# Patient Record
Sex: MALE | Race: WHITE | NOT HISPANIC OR LATINO | Employment: FULL TIME | URBAN - METROPOLITAN AREA
[De-identification: names, ages, dates, MRNs, and addresses within clinical notes are randomized per-mention and may not be internally consistent; named-entity substitution may affect disease eponyms.]

---

## 2017-01-05 ENCOUNTER — OFFICE VISIT (OUTPATIENT)
Dept: ORTHOPEDICS | Facility: CLINIC | Age: 32
End: 2017-01-05
Payer: COMMERCIAL

## 2017-01-05 VITALS — HEART RATE: 79 BPM | DIASTOLIC BLOOD PRESSURE: 84 MMHG | SYSTOLIC BLOOD PRESSURE: 136 MMHG | OXYGEN SATURATION: 98 %

## 2017-01-05 DIAGNOSIS — M79.672 LEFT FOOT PAIN: ICD-10-CM

## 2017-01-05 DIAGNOSIS — S92.352G: Primary | ICD-10-CM

## 2017-01-05 PROCEDURE — 99213 OFFICE O/P EST LOW 20 MIN: CPT | Performed by: FAMILY MEDICINE

## 2017-01-05 ASSESSMENT — PAIN SCALES - GENERAL: PAINLEVEL: MILD PAIN (3)

## 2017-01-05 NOTE — PROGRESS NOTES
HISTORY OF PRESENT ILLNESS  Mr. Dominguez is a pleasant 31 year old year old male who presents to clinic today for follow up of his Wallace fracture.  Joey explains that he still has pain, although he's improving.  He's having trouble standing for more than 4 hours at a time.  Additional history: as documented      REVIEW OF SYSTEMS (1/5/2017)  10 point ROS of systems including Constitutional, Eyes, Respiratory, Cardiovascular, Gastroenterology, Genitourinary, Integumentary, Musculoskeletal, Psychiatric were all negative except for pertinent positives noted in my HPI.     PHYSICAL EXAM  Filed Vitals:    01/05/17 0925   BP: 136/84   Pulse: 79   SpO2: 98%     General  - normal appearance, in no obvious distress  CV  - normal pulses at posterior tib and dorsalis pedis  Pulm  - normal respiratory pattern, non-labored  Musculoskeletal - left foot  - stance: in boot, favors left leg  - inspection: no swelling or effusion,  normal bone and joint alignment, no obvious deformity  - palpation: mildly TTP at base of MT5  - ROM: normal active and passive ROM of great and lesser toes, no pain with MT translation  - strength: 5/5 in all planes  Neuro  - no sensory or motor deficit, grossly normal coordination, normal muscle tone  Skin  - no ecchymosis, erythema, warmth, or induration, no obvious rash  Psych  - interactive, appropriate, normal mood and affect        ASSESSMENT & PLAN  Mr. Dominguez is a 31 year old year old male who is in the office today following up for his Wallace fracture.    Joey hasn't been able to progress to a point in which he's pain free.  I'm ordering a CT to assess for potential fracture non-union of the metatarsal.    We will follow up after his CT.    It was a pleasure taking care of Joey.        Santy Shelton DO, CAQSM

## 2017-01-05 NOTE — NURSING NOTE
"Joey Dominguez's goals for this visit include: Follow up with left foot pain.   He requests these members of his care team be copied on today's visit information: yes    PCP: Librado Howe    Referring Provider:  No referring provider defined for this encounter.    Chief Complaint   Patient presents with     RECHECK     Ankle/Foot left     Follow up with left foot pain.        Initial /84 mmHg  Pulse 79  SpO2 98% Estimated body mass index is 41.71 kg/(m^2) as calculated from the following:    Height as of 11/14/16: 1.88 m (6' 2\").    Weight as of 11/14/16: 147.419 kg (325 lb).  BP completed using cuff size: regular-left forearm    "

## 2017-01-05 NOTE — PATIENT INSTRUCTIONS
Thanks for coming today.  Ortho/Sports Medicine Clinic  [address] [place] [address]    To schedule future appointments in Ortho Clinic, you may call [phone].    To schedule ordered imaging by your provider:   Call Central Imaging [phone]    To schedule an injection ordered by your provider:  Call Central Imaging Injection scheduling line: [phone]  G[employer]elle Semiconductor[employer] available online at:  [email or web address]    Please call if any further questions or concerns ([phone]).  Clinic hours 8 am to 5 pm.    Return to clinic (call) if symptoms worsen or fail to improve.

## 2017-01-05 NOTE — MR AVS SNAPSHOT
After Visit Summary   2017    Joey Dominguez    MRN: [id number]           Patient Information     Date Of Birth          1985        Visit Information        Provider Department      2017 9:00 AM Santy Shelton,  [facility]        Today's Diagnoses     Wallace fracture, left, with delayed healing, subsequent encounter    -  1     Left foot pain           Care Instructions    Thanks for coming today.  Ortho/Sports Medicine Clinic  [address] [place] [address]    To schedule future appointments in Ortho Clinic, you may call [phone].    To schedule ordered imaging by your provider:   Call Central Imaging [phone]    To schedule an injection ordered by your provider:  Call Central Imaging Injection scheduling line: [phone]  PayParrot available online at:  [email or web address]    Please call if any further questions or concerns ([phone]).  Clinic hours 8 am to 5 pm.    Return to clinic (call) if symptoms worsen or fail to improve.          Follow-ups after your visit        Future tests that were ordered for you today     Open Future Orders        Priority Expected Expires Ordered    CT Foot Left w/o Contrast Routine  2018            Who to contact     If you have questions or need follow up information about today's clinic visit or your schedule please contact [facility] directly at [phone].  Normal or non-critical lab and imaging results will be communicated to you by MyChart, letter or phone within 4 business days after the clinic has received the results. If you do not hear from us within 7 days, please contact the clinic through MyChart or phone. If you have a critical or abnormal lab result, we will notify you by phone as soon as possible.  Submit refill requests through PayParrot or call your pharmacy and they will forward the refill request to us. Please allow 3 business days for  your refill to be completed.          Additional Information About Your Visit        ResponseTap (formerly [employer])hart Information     Layer is an electronic gateway that provides easy, online access to your medical records. With Layer, you can request a clinic appointment, read your test results, renew a prescription or communicate with your care team.     To sign up for Layer visit the website at [email or web address]   You will be asked to enter the access code listed below, as well as some personal information. Please follow the directions to create your username and password.     Your access code is: [email or web address]  Expires: 2017  9:51 AM     Your access code will  in 90 days. If you need help or a new code, please contact your [facility] or call [phone] for assistance.        Care EveryWhere ID     This is your Care EveryWhere ID. This could be used by other organizations to access your [facility] medical records  [id number]        Your Vitals Were     Pulse Pulse Oximetry                79 98%           Blood Pressure from Last 3 Encounters:   17 136/84   16 148/100   16 158/92    Weight from Last 3 Encounters:   16 147.419 kg (325 lb)   05/10/16 138.801 kg (306 lb)   12/10/13 153.769 kg (339 lb)               Primary Care Provider Office Phone # Fax #    Librado Howe PA-C [phone]       [facility] [address] [facility] [address]        Thank you!     Thank you for choosing [facility]  for your care. Our goal is always to provide you with excellent care. Hearing back from our patients is one way we can continue to improve our services. Please take a few minutes to complete the written survey that you may receive in the mail after your visit with us. Thank you!             Your Updated Medication List - Protect others around you: Learn how to safely use, store and throw away your medicines at  www.disposemymeds.org.          This list is accurate as of: 1/5/17  9:45 AM.  Always use your most recent med list.                   Brand Name Dispense Instructions for use    traMADol 50 MG tablet    ULTRAM    30 tablet    Take 1 tablet (50 mg) by mouth every 6 hours as needed for pain

## 2017-01-06 ENCOUNTER — RADIANT APPOINTMENT (OUTPATIENT)
Dept: CT IMAGING | Facility: CLINIC | Age: 32
End: 2017-01-06
Attending: FAMILY MEDICINE
Payer: COMMERCIAL

## 2017-01-06 ENCOUNTER — DOCUMENTATION ONLY (OUTPATIENT)
Dept: ORTHOPEDICS | Facility: CLINIC | Age: 32
End: 2017-01-06

## 2017-01-06 DIAGNOSIS — S92.352G: ICD-10-CM

## 2017-01-06 DIAGNOSIS — M79.672 LEFT FOOT PAIN: ICD-10-CM

## 2017-01-06 PROCEDURE — 73700 CT LOWER EXTREMITY W/O DYE: CPT | Mod: LT | Performed by: RADIOLOGY

## 2017-01-06 NOTE — PROGRESS NOTES
Attending Physicians Statement/ Progress report form completed and faxed to the [facility] with the past two office visits, 2 past xray reports and CT order.

## 2017-01-09 ENCOUNTER — TELEPHONE (OUTPATIENT)
Dept: ORTHOPEDICS | Facility: CLINIC | Age: 32
End: 2017-01-09

## 2017-01-09 DIAGNOSIS — M79.672 LEFT FOOT PAIN: ICD-10-CM

## 2017-01-09 DIAGNOSIS — S92.352G: Primary | ICD-10-CM

## 2017-01-09 NOTE — TELEPHONE ENCOUNTER
[facility] Call Center    Phone Message    Name of Caller: Joey    Phone Number: Home number on file [phone] (home)    Best time to return call: Any    May a detailed message be left on voicemail: yes    Relation to patient: Self    Reason for Call: Other: Patient is calling stating that disability paperwork has not reached the [facility] claims.  They are threatening to not pay patient and he is calling requesting that the disability paperwork be faxed over ASAP to [phone] claim #[id number]  if possible. Please call back to discuss.     Action Taken: Message routed to:  Adult Clinics: Sports Medicine p [phone]

## 2017-01-09 NOTE — TELEPHONE ENCOUNTER
Paperwork was re-faxed today 1/9/17. Original fax on 1/6/17 did not go through.  Gave copy of paperwork to pt's wife. Per Dr. Shelton he will be referring patient to a surgical podiatrist. Patient will check insurance coverage for which surgeon would be in network. After patient checks on insurance will refer to appropriate surgical podiatrist for consult.

## 2017-01-09 NOTE — TELEPHONE ENCOUNTER
I called Joey to inform him of his CT results. 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 Given our failure of non-operative treatment I'm referring him to our surgical colleagues at the [facility] to have a discussion regarding possible surgical management.    TEMI

## 2017-01-10 ENCOUNTER — TELEPHONE (OUTPATIENT)
Dept: ORTHOPEDICS | Facility: CLINIC | Age: 32
End: 2017-01-10

## 2017-01-10 NOTE — TELEPHONE ENCOUNTER
[facility] Call Center    Phone Message    Name of Caller: Joye    Phone Number: Home number on file [phone] (home)    Best time to return call: ANY    May a detailed message be left on voicemail: yes    Relation to patient: Self    Reason for Call: Other: Patient needs images of CT and Xrays sent to Dr. Farley's office. He has an appointment tomorrow morning. Thank you. please call back once complete     Action Taken: Message routed to:  Adult Clinics: Podiatry p [phone]

## 2017-01-19 ENCOUNTER — OFFICE VISIT (OUTPATIENT)
Dept: ORTHOPEDICS | Facility: CLINIC | Age: 32
End: 2017-01-19
Payer: COMMERCIAL

## 2017-01-19 VITALS — DIASTOLIC BLOOD PRESSURE: 102 MMHG | SYSTOLIC BLOOD PRESSURE: 150 MMHG | OXYGEN SATURATION: 98 % | HEART RATE: 87 BPM

## 2017-01-19 DIAGNOSIS — S92.352G: Primary | ICD-10-CM

## 2017-01-19 PROCEDURE — 99213 OFFICE O/P EST LOW 20 MIN: CPT | Performed by: FAMILY MEDICINE

## 2017-01-19 ASSESSMENT — PAIN SCALES - GENERAL: PAINLEVEL: MILD PAIN (2)

## 2017-01-19 NOTE — NURSING NOTE
"Joey Dominguez's goals for this visit include: Follow up with left foot pain.   He requests these members of his care team be copied on today's visit information: yes    PCP: Librado Howe    Referring Provider:  No referring provider defined for this encounter.    Chief Complaint   Patient presents with     RECHECK     Ankle/Foot left     Follow up with Wallace fracture of the left foot. Patient states that the pain has turned into more of a dull ache but by the end of the day it feels as though someone has stepped on it.        Initial /102 mmHg  Pulse 87  SpO2 98% Estimated body mass index is 41.71 kg/(m^2) as calculated from the following:    Height as of 11/14/16: 1.88 m (6' 2\").    Weight as of 11/14/16: 147.419 kg (325 lb).  BP completed using cuff size: large    "

## 2017-01-19 NOTE — PATIENT INSTRUCTIONS
Thanks for coming today.  Ortho/Sports Medicine Clinic  [address] [place] [address]    To schedule future appointments in Ortho Clinic, you may call [phone].    To schedule ordered imaging by your provider:   Call Central Imaging [phone]    To schedule an injection ordered by your provider:  Call Central Imaging Injection scheduling line: [phone]  OpenCloudhart available online at:  [email or web address]    Please call if any further questions or concerns ([phone]).  Clinic hours 8 am to 5 pm.    Return to clinic (call) if symptoms worsen or fail to improve.

## 2017-01-19 NOTE — PROGRESS NOTES
HISTORY OF PRESENT ILLNESS  Mr. Dominguez is a pleasant 31 year old year old male who presents to clinic today for follow up of his Wallace fracture.  Joey was seen last week by Dr. Farley.  At that time the boot was removed and his activity was progressed.  Plan was made at that visit to wait until Wednesday (yesterday) and if pain wasn't improved perform surgery (tomorrow).  Fortunately, Joey is feeling a bit better today.  Pain still present, moderate, more of a dull ache.  Does get worse towards the end of the day.    Severity: moderate  Timing: occurs intermittently  Previous similar pain: yes  Additional history: as documented      REVIEW OF SYSTEMS (1/19/2017)  10 point ROS of systems including Constitutional, Eyes, Respiratory, Cardiovascular, Gastroenterology, Genitourinary, Integumentary, Musculoskeletal, Psychiatric were all negative except for pertinent positives noted in my HPI.     PHYSICAL EXAM  Filed Vitals:    01/19/17 0905   BP: 150/102   Pulse: 87   SpO2: 98%         ASSESSMENT & PLAN  Mr. Dominguez is a 31 year old year old male who is in the office today following up for his Wallace fracture.    I'm happy that he's improving.  I filled out paperwork returning him to work with the solitary restriction of no climbing traditional ladders for the next 2 weeks.    Joey can follow up as needed.    It was a pleasure taking care of Joey.    20 minutes was spent in the room, 15 of which was spent on counseling and coordination of care.        Santy Shelton, DO, CAQSM

## 2017-04-25 DIAGNOSIS — M79.672 LEFT FOOT PAIN: Primary | ICD-10-CM

## 2017-04-26 ENCOUNTER — RADIANT APPOINTMENT (OUTPATIENT)
Dept: GENERAL RADIOLOGY | Facility: CLINIC | Age: 32
End: 2017-04-26
Attending: FAMILY MEDICINE
Payer: COMMERCIAL

## 2017-04-26 DIAGNOSIS — M79.672 LEFT FOOT PAIN: ICD-10-CM

## 2017-04-26 PROCEDURE — 73630 X-RAY EXAM OF FOOT: CPT | Mod: LT | Performed by: RADIOLOGY

## 2017-07-13 DIAGNOSIS — M79.672 LEFT FOOT PAIN: Primary | ICD-10-CM

## 2017-08-30 ENCOUNTER — OFFICE VISIT (OUTPATIENT)
Dept: FAMILY MEDICINE | Facility: CLINIC | Age: 32
End: 2017-08-30
Payer: COMMERCIAL

## 2017-08-30 VITALS
TEMPERATURE: 97.6 F | DIASTOLIC BLOOD PRESSURE: 102 MMHG | WEIGHT: 315 LBS | BODY MASS INDEX: 40.96 KG/M2 | HEART RATE: 70 BPM | SYSTOLIC BLOOD PRESSURE: 154 MMHG

## 2017-08-30 DIAGNOSIS — Z13.1 SCREENING FOR DIABETES MELLITUS: ICD-10-CM

## 2017-08-30 DIAGNOSIS — R03.0 ELEVATED BLOOD PRESSURE READING WITHOUT DIAGNOSIS OF HYPERTENSION: ICD-10-CM

## 2017-08-30 DIAGNOSIS — F41.1 GAD (GENERALIZED ANXIETY DISORDER): ICD-10-CM

## 2017-08-30 DIAGNOSIS — K13.70 ORAL MUCOSAL LESION: Primary | ICD-10-CM

## 2017-08-30 LAB
ANION GAP SERPL CALCULATED.3IONS-SCNC: 13 MMOL/L (ref 3–14)
BUN SERPL-MCNC: 5 MG/DL (ref 7–30)
CALCIUM SERPL-MCNC: 9.5 MG/DL (ref 8.5–10.1)
CHLORIDE SERPL-SCNC: 96 MMOL/L (ref 94–109)
CO2 SERPL-SCNC: 25 MMOL/L (ref 20–32)
CREAT SERPL-MCNC: 0.83 MG/DL (ref 0.66–1.25)
ERYTHROCYTE [DISTWIDTH] IN BLOOD BY AUTOMATED COUNT: 12 % (ref 10–15)
GFR SERPL CREATININE-BSD FRML MDRD: >90 ML/MIN/1.7M2
GLUCOSE SERPL-MCNC: 105 MG/DL (ref 70–99)
HBA1C MFR BLD: 5.1 % (ref 4.3–6)
HCT VFR BLD AUTO: 50.2 % (ref 40–53)
HGB BLD-MCNC: 18 G/DL (ref 13.3–17.7)
MCH RBC QN AUTO: 33.2 PG (ref 26.5–33)
MCHC RBC AUTO-ENTMCNC: 35.9 G/DL (ref 31.5–36.5)
MCV RBC AUTO: 93 FL (ref 78–100)
PLATELET # BLD AUTO: 217 10E9/L (ref 150–450)
POTASSIUM SERPL-SCNC: 3.6 MMOL/L (ref 3.4–5.3)
RBC # BLD AUTO: 5.42 10E12/L (ref 4.4–5.9)
SODIUM SERPL-SCNC: 134 MMOL/L (ref 133–144)
TSH SERPL DL<=0.005 MIU/L-ACNC: 2.72 MU/L (ref 0.4–4)
WBC # BLD AUTO: 8.2 10E9/L (ref 4–11)

## 2017-08-30 PROCEDURE — 84443 ASSAY THYROID STIM HORMONE: CPT | Performed by: PHYSICIAN ASSISTANT

## 2017-08-30 PROCEDURE — 83036 HEMOGLOBIN GLYCOSYLATED A1C: CPT | Performed by: PHYSICIAN ASSISTANT

## 2017-08-30 PROCEDURE — 80048 BASIC METABOLIC PNL TOTAL CA: CPT | Performed by: PHYSICIAN ASSISTANT

## 2017-08-30 PROCEDURE — 85027 COMPLETE CBC AUTOMATED: CPT | Performed by: PHYSICIAN ASSISTANT

## 2017-08-30 PROCEDURE — 99214 OFFICE O/P EST MOD 30 MIN: CPT | Performed by: PHYSICIAN ASSISTANT

## 2017-08-30 PROCEDURE — 36415 COLL VENOUS BLD VENIPUNCTURE: CPT | Performed by: PHYSICIAN ASSISTANT

## 2017-08-30 RX ORDER — CITALOPRAM HYDROBROMIDE 10 MG/1
10 TABLET ORAL DAILY
Qty: 30 TABLET | Refills: 0 | Status: SHIPPED | OUTPATIENT
Start: 2017-08-30

## 2017-08-30 ASSESSMENT — ENCOUNTER SYMPTOMS
RESPIRATORY NEGATIVE: 1
GASTROINTESTINAL NEGATIVE: 1
CONSTITUTIONAL NEGATIVE: 1
EYE PAIN: 0
HEMOPTYSIS: 0
CARDIOVASCULAR NEGATIVE: 1
COUGH: 0
MEMORY LOSS: 0
NERVOUS/ANXIOUS: 1
DEPRESSION: 0
BLURRED VISION: 1
HALLUCINATIONS: 0
DIAPHORESIS: 0
INSOMNIA: 0
PALPITATIONS: 0
WEIGHT LOSS: 0
FEVER: 0

## 2017-08-30 ASSESSMENT — ANXIETY QUESTIONNAIRES
7. FEELING AFRAID AS IF SOMETHING AWFUL MIGHT HAPPEN: MORE THAN HALF THE DAYS
6. BECOMING EASILY ANNOYED OR IRRITABLE: MORE THAN HALF THE DAYS
2. NOT BEING ABLE TO STOP OR CONTROL WORRYING: MORE THAN HALF THE DAYS
3. WORRYING TOO MUCH ABOUT DIFFERENT THINGS: NEARLY EVERY DAY
1. FEELING NERVOUS, ANXIOUS, OR ON EDGE: NEARLY EVERY DAY
IF YOU CHECKED OFF ANY PROBLEMS ON THIS QUESTIONNAIRE, HOW DIFFICULT HAVE THESE PROBLEMS MADE IT FOR YOU TO DO YOUR WORK, TAKE CARE OF THINGS AT HOME, OR GET ALONG WITH OTHER PEOPLE: SOMEWHAT DIFFICULT
GAD7 TOTAL SCORE: 17
5. BEING SO RESTLESS THAT IT IS HARD TO SIT STILL: NEARLY EVERY DAY

## 2017-08-30 ASSESSMENT — PATIENT HEALTH QUESTIONNAIRE - PHQ9: 5. POOR APPETITE OR OVEREATING: MORE THAN HALF THE DAYS

## 2017-08-30 ASSESSMENT — LIFESTYLE VARIABLES: SUBSTANCE_ABUSE: 0

## 2017-08-30 NOTE — MR AVS SNAPSHOT
After Visit Summary   8/30/2017    Joey Dominguez    MRN: [id number]           Patient Information     Date Of Birth          1985        Visit Information        Provider Department      8/30/2017 11:00 AM Hanny Murillo PA-C [facility]        Today's Diagnoses     Oral mucosal lesion    -  1    BARTOLO (generalized anxiety disorder)           Follow-ups after your visit        Additional Services     ORAL SURGERY REFERRAL       Your provider has referred you to: [facility]  [phone], [facility] ([place]) [phone], [facility]  [phone] and [facility] ([facility]) [phone]      Please be aware that coverage of these services is subject to the terms and limitations of your health insurance plan.  Call member services at your health plan with any benefit or coverage questions.      Please bring the following to your appointment:    >>   Any x-rays, CTs or MRIs which have been performed.  Contact the facility where they were done to arrange for  prior to your scheduled appointment.    >>   List of current medications   >>   This referral request   >>   Any documents/labs given to you for this referral                  Who to contact     If you have questions or need follow up information about today's clinic visit or your schedule please contact [facility] directly at [phone].  Normal or non-critical lab and imaging results will be communicated to you by MyChart, letter or phone within 4 business days after the clinic has received the results. If you do not hear from us within 7 days, please contact the clinic through MyChart or phone. If you have a critical or abnormal lab result, we will notify you by phone as soon as possible.  Submit refill requests through Innova Card or call your pharmacy and they will forward the refill request to us. Please  "allow 3 business days for your refill to be completed.          Additional Information About Your Visit        MyChart Information     Concur Technologieshart lets you send messages to your doctor, view your test results, renew your prescriptions, schedule appointments and more. To sign up, go to [email or web address] . Click on \"Log in\" on the left side of the screen, which will take you to the Welcome page. Then click on \"Sign up Now\" on the right side of the page.     You will be asked to enter the access code listed below, as well as some personal information. Please follow the directions to create your username and password.     Your access code is: [email or web address]  Expires: 2017 11:18 AM     Your access code will  in 90 days. If you need help or a new code, please call your [facility] clinic or [phone].        Care EveryWhere ID     This is your Care EveryWhere ID. This could be used by other organizations to access your [facility] medical records  [id number]        Your Vitals Were     Temperature BMI (Body Mass Index)                97.6  F (36.4  C) (Oral) 40.96 kg/m2           Blood Pressure from Last 3 Encounters:   17 (!) 150/102   17 136/84   16 (!) 148/100    Weight from Last 3 Encounters:   17 (!) 319 lb (144.7 kg)   16 (!) 325 lb (147.4 kg)   05/10/16 (!) 306 lb (138.8 kg)              We Performed the Following     ORAL SURGERY REFERRAL          Today's Medication Changes          These changes are accurate as of: 17 11:21 AM.  If you have any questions, ask your nurse or doctor.               Start taking these medicines.        Dose/Directions    citalopram 10 MG tablet   Commonly known as:  celeXA   Used for:  BARTOLO (generalized anxiety disorder)   Started by:  Hanny Murillo PA-C        Dose:  10 mg   Take 1 tablet (10 mg) by mouth daily   Quantity:  30 tablet   Refills:  0            Where to get your medicines      These medications were sent to [facility] " [facility] [address] [facility] [address]  [address] [address]     Phone:  [phone]     citalopram 10 MG tablet                Primary Care Provider Office Phone # Fax #    Librado Dago Howe PA-C [phone] [address] [facility] [address]        Equal Access to Services     LEEANNE BOJORQUEZ : Hadii aad ku hadasho Soomaali, waaxda luqadaha, qaybta kaalmada adeegyada, waxay idiin hayaan adeeg kharash la'aadavid . So [facility] [phone].    ATENCIÓN: Si habla español, tiene a hogan disposición servicios gratuitos de asistencia lingüística. [facility] [phone].    We comply with applicable federal civil rights laws and [place] laws. We do not discriminate on the basis of race, color, national origin, age, disability sex, sexual orientation or gender identity.            Thank you!     Thank you for choosing [facility]  for your care. Our goal is always to provide you with excellent care. Hearing back from our patients is one way we can continue to improve our services. Please take a few minutes to complete the written survey that you may receive in the mail after your visit with us. Thank you!             Your Updated Medication List - Protect others around you: Learn how to safely use, store and throw away your medicines at www.disposemymeds.org.          This list is accurate as of: 8/30/17 11:21 AM.  Always use your most recent med list.                   Brand Name Dispense Instructions for use Diagnosis    citalopram 10 MG tablet    celeXA    30 tablet    Take 1 tablet (10 mg) by mouth daily    BARTOLO (generalized anxiety disorder)       traMADol 50 MG tablet    ULTRAM    30 tablet    Take 1 tablet (50 mg) by mouth every 6 hours as needed for pain    Wallace fracture, right, closed, initial encounter

## 2017-08-30 NOTE — NURSING NOTE
"Chief Complaint   Patient presents with     Mouth Problem     sore under tongue     Anxiety       Initial Temp 97.6  F (36.4  C) (Oral)  Wt (!) 319 lb (144.7 kg)  BMI 40.96 kg/m2 Estimated body mass index is 40.96 kg/(m^2) as calculated from the following:    Height as of 11/14/16: 6' 2\" (1.88 m).    Weight as of this encounter: 319 lb (144.7 kg).  Medication Reconciliation: complete   Sil Stratton CMA      "

## 2017-08-30 NOTE — PROGRESS NOTES
SUBJECTIVE:     HPI   Joey Dominguez is a 32 year old male who presents today with oral lesion for the past 2 months.  Has had this oral lesion for the past 2 months which was first noticed by his dentist who had wanted to do a biopsy but patient had declined at that time. For the past 2 weeks he has noticed pain and discomfort with his oral lesion and is now reconsidering biopsy.  States that lesion is underneath his tongue on the left side. He is unable to see it. No drainage or increases in size. He is a smoker and does drink. Does not chew or use drugs.  2) also reports worsening anxiety for the past 1 month associated with this oral lesion and family issues.  States anxiety has been getting worse affecting his ADLs and would like to start medications at this time.  Denies any anxiety or panic attacks. No depression or SI/HI.  Reports having constant worry along with dry mouth and breaking a sweat for the past couple days.  Has daughter who is currently in DBT treatment for anxiety.  3) is also requesting to be tested for diabetes today. States that he has noticed visual changes along with increased thirst and urination. No fatigue.  No family history of diabetes.    Reviewed PMH.  Patient Active Problem List   Diagnosis     CARDIOVASCULAR SCREENING; LDL GOAL LESS THAN 160     Tobacco abuse     Anal fistula     No current Outpatient Prescriptions   Medication Sig Dispense Refill     Allergies   Allergen Reactions     Sulfa Drugs        Review of Systems   Constitutional: Negative.  Negative for diaphoresis, fever and weight loss.   HENT: Negative.    Eyes: Positive for blurred vision. Negative for pain.   Respiratory: Negative.  Negative for cough and hemoptysis.    Cardiovascular: Negative.  Negative for chest pain and palpitations.   Gastrointestinal: Negative.    Genitourinary: Negative.    Skin: Negative.    Endo/Heme/Allergies: Negative for environmental allergies.   Psychiatric/Behavioral: Negative  for depression, hallucinations, memory loss, substance abuse and suicidal ideas. The patient is nervous/anxious. The patient does not have insomnia.    All other systems reviewed and are negative.      Physical Exam   Constitutional: He is oriented to person, place, and time and well-developed, well-nourished, and in no distress. No distress.   Is up and walking around.   HENT:   Head: Normocephalic and atraumatic.   Nose: Nose normal.   Mouth/Throat: Uvula is midline and mucous membranes are normal. Oral lesions present. No oropharyngeal exudate, posterior oropharyngeal edema, posterior oropharyngeal erythema or tonsillar abscesses.   I'm unable to see the oral lesion on exam but I am able to palpate this. It measures about 1.5cmX1.0cm present over the left posteriolateral aspect of the tongue.  Mild tenderness but no visible drainage.   Eyes: Conjunctivae and EOM are normal. Pupils are equal, round, and reactive to light. No scleral icterus.   Neck: Normal range of motion. Neck supple. No thyromegaly present.   Cardiovascular: Normal rate, regular rhythm, normal heart sounds and intact distal pulses.  Exam reveals no gallop and no friction rub.    No murmur heard.  Pulmonary/Chest: Effort normal and breath sounds normal. No respiratory distress. He has no wheezes. He has no rales.   Lymphadenopathy:     He has no cervical adenopathy.   Neurological: He is alert and oriented to person, place, and time.   Skin: Skin is warm and dry. No rash noted.   Psychiatric: Affect normal. His mood appears anxious. He does not exhibit a depressed mood. He expresses no homicidal and no suicidal ideation. He expresses no suicidal plans and no homicidal plans.   Nursing note and vitals reviewed.      Assessment/Plan:  Oral mucosal lesion:  I am unable to view this on exam but was able to palpate this. Question benign versus malignant lesion due to his history of tobacco or alcohol use. Will send to orofacial maxillary specialist  for further evaluation and management. Encouraged tobacco and ETOH cessation.  -     ORAL SURGERY REFERRAL    BARTOLO (generalized anxiety disorder):  GAD7 score is elevated. Has had worsening anxiety for the past month affecting his ADLs. We'll start citalopram as directed and recommended psychotherapy which he will schedule when he is ready.  We'll also check labs below as well.  Discussed risks and benefits of medication along with side effects, direction for use, and warning signs for worsening depression. If he develops worsening depression and SI/HI, he is to stop the medication and call us or go to the ER. Otherwise, recheck in 1 month.  -     citalopram (CELEXA) 10 MG tablet; Take 1 tablet (10 mg) by mouth daily  -     CBC with platelets  -     Basic metabolic panel  -     TSH with free T4 reflex    Elevated blood pressure reading without diagnosis of hypertension:  Most likely secondary to his anxiety. Recommend repeat blood pressure in 1-2 weeks at the pharmacy and if still elevated follow-up with PCP in clinic.    Screening for diabetes mellitus  -     Hemoglobin A1c        Hanny Murillo PA-C

## 2017-08-31 ASSESSMENT — ANXIETY QUESTIONNAIRES: GAD7 TOTAL SCORE: 17

## 2018-01-16 ENCOUNTER — RADIANT APPOINTMENT (OUTPATIENT)
Dept: GENERAL RADIOLOGY | Facility: CLINIC | Age: 33
End: 2018-01-16
Attending: FAMILY MEDICINE
Payer: COMMERCIAL

## 2018-01-16 ENCOUNTER — OFFICE VISIT (OUTPATIENT)
Dept: ORTHOPEDICS | Facility: CLINIC | Age: 33
End: 2018-01-16
Payer: COMMERCIAL

## 2018-01-16 VITALS — HEART RATE: 85 BPM | DIASTOLIC BLOOD PRESSURE: 101 MMHG | SYSTOLIC BLOOD PRESSURE: 152 MMHG | OXYGEN SATURATION: 98 %

## 2018-01-16 DIAGNOSIS — M54.50 LUMBAGO: ICD-10-CM

## 2018-01-16 DIAGNOSIS — M54.50 ACUTE BILATERAL LOW BACK PAIN WITHOUT SCIATICA: Primary | ICD-10-CM

## 2018-01-16 PROCEDURE — 99214 OFFICE O/P EST MOD 30 MIN: CPT | Performed by: FAMILY MEDICINE

## 2018-01-16 PROCEDURE — 72100 X-RAY EXAM L-S SPINE 2/3 VWS: CPT | Performed by: RADIOLOGY

## 2018-01-16 RX ORDER — CYCLOBENZAPRINE HCL 10 MG
10 TABLET ORAL 3 TIMES DAILY PRN
Qty: 30 TABLET | Refills: 1 | Status: SHIPPED | OUTPATIENT
Start: 2018-01-16

## 2018-01-16 RX ORDER — NAPROXEN 500 MG/1
500 TABLET ORAL 2 TIMES DAILY PRN
Qty: 30 TABLET | Refills: 1 | Status: SHIPPED | OUTPATIENT
Start: 2018-01-16

## 2018-01-16 ASSESSMENT — PAIN SCALES - GENERAL: PAINLEVEL: EXTREME PAIN (8)

## 2018-01-16 NOTE — LETTER
1/16/2018         RE: Joey Dominguez  [address] [facility] [address]        Dear Colleague,    Thank you for referring your patient, Joey Dominguez, to the [facility]. Please see a copy of my visit note below.    HISTORY OF PRESENT ILLNESS  Mr. Dominguez is a pleasant 32 year old year old male here with low back pain.  I've seen Joey in the past for his foot.    Joey started his snowblower yesterday when he felt a sudden pull in his low back.  Right sided, focal, does not radiate down his legs.  Throughout the day he had market trouble with standing, walking, sitting, and being in one position.  This has happened about 6 times in the past calendar year.  Usually this resolves with chiropractic treatment and conservative care.  Additional history: as documented      REVIEW OF SYSTEMS (1/16/2018)  10 point ROS of systems including Constitutional, Eyes, Respiratory, Cardiovascular, Gastroenterology, Genitourinary, Integumentary, Musculoskeletal, Psychiatric were all negative except for pertinent positives noted in my HPI.     PHYSICAL EXAM  Vitals:    01/16/18 0844   BP: (!) 152/101   Pulse: 85   SpO2: 98%     General  - normal appearance, in no obvious distress  CV  - normal peripheral perfusion  Pulm  - normal respiratory pattern, non-labored  Musculoskeletal - lumbar spine  - stance: slow to rise and sit  - inspection: normal bone and joint alignment, no obvious scoliosis  - palpation: bilateral lumbar paravertebral spasm  - ROM: pain with bilateral rotation, flexion past 30 deg, extension  - strength: lower extremities 5/5 in all planes  - special tests:  (-) slump test  Neuro  - patellar and Achilles DTRs 2+ bilaterally, no sensory or motor deficit, grossly normal coordination, normal muscle tone  Skin  - no ecchymosis, erythema, warmth, or induration, no obvious rash  Psych  - interactive, appropriate, normal mood and affect        ASSESSMENT & PLAN  Mr. Dominguez is a 32  year old year old male her with acute low back pain.    I ordered & reviewed an x-ray of his lumbar spine which does show mild to moderate levoscoliosis.  There is no obvious disc space narrowing apparent.    We discussed early management of acute low back pain with early mobilization, oral analgesics, ice or heat as helpful, and muscle relaxers.  I prescribed [place] Flexeril and naproxen to take as needed.    [place] is going to let me know how he is doing tomorrow, if worsening I did consider oral steroids.  If no better or worsening in the next week to 2 weeks I would likely order advanced imaging.    It was a pleasure seeing Joey.        Santy Shelton DO, [facility]          Again, thank you for allowing me to participate in the care of your patient.        Sincerely,        Santy Shelton DO

## 2018-01-16 NOTE — PATIENT INSTRUCTIONS
Thanks for coming today.  Ortho[facility]Sports Medicine Clinic  [address] [place] [address]    To schedule future appointments in Ortho Clinic, you may call [phone].    To schedule ordered imaging by your provider:   Call Central Imaging [phone]    To schedule an injection ordered by your provider:  Call Central Imaging Injection scheduling line: [phone]  [facility] available online at:  [email or web address]    Please call if any further questions or concerns ([phone]).  Clinic hours 8 am to 5 pm.    Return to clinic (call) if symptoms worsen or fail to improve.

## 2018-01-16 NOTE — MR AVS SNAPSHOT
After Visit Summary   2018    Joey Dominguez    MRN: [id number]           Patient Information     Date Of Birth          1985        Visit Information        Provider Department      2018 8:40 AM Santy Shelton, DO [facility]        Today's Diagnoses     Acute bilateral low back pain without sciatica    -  1      Care Instructions    Thanks for coming today.  Ortho[facility] Medicine Clinic  [address] [place] [id number]    To schedule future appointments in Ortho Clinic, you may call [phone].    To schedule ordered imaging by your provider:   Call Central Imaging [phone]    To schedule an injection ordered by your provider:  Call Central Imaging Injection scheduling line: [phone]  MyChart available online at:  [email or web address]    Please call if any further questions or concerns ([phone]).  Clinic hours 8 am to 5 pm.    Return to clinic (call) if symptoms worsen or fail to improve.            Follow-ups after your visit        Your next 10 appointments already scheduled     2018  9:15 AM CST   (Arrive by 9:00 AM)   XR LUMBAR SPINE 2/3 VIEWS with MGXR1   [facility]    [address] [place] [id number] [phone]           Please bring a list of your current medicines to your exam. (Include vitamins, minerals and over-thecounter medicines.) Leave your valuables at home.  Tell your doctor if there is a chance you may be pregnant.  You do not need to do anything special for this exam.              Who to contact     If you have questions or need follow up information about today's clinic visit or your schedule please contact [facility] directly at [phone].  Normal or non-critical lab and imaging results will be communicated to you by MyChart, letter or phone within 4 business days after the clinic has received the results.  If you do not hear from us within 7 days, please contact the clinic through [employer] or phone. If you have a critical or abnormal lab result, we will notify you by phone as soon as possible.  Submit refill requests through [employer] or call your pharmacy and they will forward the refill request to us. Please allow 3 business days for your refill to be completed.          Additional Information About Your Visit        [employer] Information     [employer] is an electronic gateway that provides easy, online access to your medical records. With [employer], you can request a clinic appointment, read your test results, renew a prescription or communicate with your care team.     To sign up for [employer] visit the website at [email or web address].   You will be asked to enter the access code listed below, as well as some personal information. Please follow the directions to create your username and password.     Your access code is: [email or web address]  Expires: 2018  9:12 AM     Your access code will  in 90 days. If you need help or a new code, please contact your [facility] or call [phone] for assistance.        Care EveryWhere ID     This is your Care EveryWhere ID. This could be used by other organizations to access your [facility] medical records  [id number]        Your Vitals Were     Pulse Pulse Oximetry                85 98%           Blood Pressure from Last 3 Encounters:   18 (!) 152/101   17 (!) 154/102   17 (!) 150/102    Weight from Last 3 Encounters:   17 (!) 144.7 kg (319 lb)   16 (!) 147.4 kg (325 lb)   05/10/16 (!) 138.8 kg (306 lb)                 Today's Medication Changes          These changes are accurate as of: 18  9:12 AM.  If you have any questions, ask your nurse or doctor.               Start taking these medicines.        Dose/Directions    cyclobenzaprine 10 MG tablet   Commonly known as:  FLEXERIL   Used for:  Acute bilateral  low back pain without sciatica   Started by:  Santy Shelton, DO        Dose:  10 mg   Take 1 tablet (10 mg) by mouth 3 times daily as needed for muscle spasms   Quantity:  30 tablet   Refills:  1       naproxen 500 MG tablet   Commonly known as:  NAPROSYN   Used for:  Acute bilateral low back pain without sciatica   Started by:  Santy Shelton, DO        Dose:  500 mg   Take 1 tablet (500 mg) by mouth 2 times daily as needed for moderate pain   Quantity:  30 tablet   Refills:  1            Where to get your medicines      These medications were sent to [facility] [address] [facility] [address] [facility] [address]  [address], [facility] [address]     Phone:  [phone]     cyclobenzaprine 10 MG tablet    naproxen 500 MG tablet                Primary Care Provider Office Phone # Fax #    Librado Dago Howe PA-C [phone] [address] [facility] [address]        Equal Access to Services     [facility]: Hadii aad ku hadasho Soomaali, waaxda luqadaha, qaybta kaalmada adeegyada, waxay idiin hayaan suhas ledbetter . So [facility] [phone].    ATENCIÓN: Si habla español, tiene a hogan disposición servicios gratuitos de asistencia lingüística. [facility] [phone].    We comply with applicable federal civil rights laws and [place] laws. We do not discriminate on the basis of race, color, national origin, age, disability, sex, sexual orientation, or gender identity.            Thank you!     Thank you for choosing [facility]  for your care. Our goal is always to provide you with excellent care. Hearing back from our patients is one way we can continue to improve our services. Please take a few minutes to complete the written survey that you may receive in the mail after your visit with us. Thank you!             Your Updated Medication List - Protect others around you: Learn how to safely use, store and throw away  your medicines at www.disposemymeds.org.          This list is accurate as of: 1/16/18  9:12 AM.  Always use your most recent med list.                   Brand Name Dispense Instructions for use Diagnosis    citalopram 10 MG tablet    celeXA    30 tablet    Take 1 tablet (10 mg) by mouth daily    BARTOLO (generalized anxiety disorder)       cyclobenzaprine 10 MG tablet    FLEXERIL    30 tablet    Take 1 tablet (10 mg) by mouth 3 times daily as needed for muscle spasms    Acute bilateral low back pain without sciatica       naproxen 500 MG tablet    NAPROSYN    30 tablet    Take 1 tablet (500 mg) by mouth 2 times daily as needed for moderate pain    Acute bilateral low back pain without sciatica

## 2018-01-16 NOTE — NURSING NOTE
"Joey Dominguez's goals for this visit include: evaluate lumbar back pain  He requests these members of his care team be copied on today's visit information: yes    PCP: Librado Howe    Referring Provider:  No referring provider defined for this encounter.    Chief Complaint   Patient presents with     RECHECK     lumbar back pain. pt states he heard a pop since yesterday.        Initial BP (!) 152/101  Pulse 85  SpO2 98% Estimated body mass index is 40.96 kg/(m^2) as calculated from the following:    Height as of 11/14/16: 1.88 m (6' 2\").    Weight as of 8/30/17: 144.7 kg (319 lb).  Medication Reconciliation: complete    "

## 2018-01-16 NOTE — PROGRESS NOTES
HISTORY OF PRESENT ILLNESS  Mr. Dominguez is a pleasant 32 year old year old male here with low back pain.  I've seen Joey in the past for his foot.    Joey started his snowblower yesterday when he felt a sudden pull in his low back.  Right sided, focal, does not radiate down his legs.  Throughout the day he had market trouble with standing, walking, sitting, and being in one position.  This has happened about 6 times in the past calendar year.  Usually this resolves with chiropractic treatment and conservative care.  Additional history: as documented      REVIEW OF SYSTEMS (1/16/2018)  10 point ROS of systems including Constitutional, Eyes, Respiratory, Cardiovascular, Gastroenterology, Genitourinary, Integumentary, Musculoskeletal, Psychiatric were all negative except for pertinent positives noted in my HPI.     PHYSICAL EXAM  Vitals:    01/16/18 0844   BP: (!) 152/101   Pulse: 85   SpO2: 98%     General  - normal appearance, in no obvious distress  CV  - normal peripheral perfusion  Pulm  - normal respiratory pattern, non-labored  Musculoskeletal - lumbar spine  - stance: slow to rise and sit  - inspection: normal bone and joint alignment, no obvious scoliosis  - palpation: bilateral lumbar paravertebral spasm  - ROM: pain with bilateral rotation, flexion past 30 deg, extension  - strength: lower extremities 5/5 in all planes  - special tests:  (-) slump test  Neuro  - patellar and Achilles DTRs 2+ bilaterally, no sensory or motor deficit, grossly normal coordination, normal muscle tone  Skin  - no ecchymosis, erythema, warmth, or induration, no obvious rash  Psych  - interactive, appropriate, normal mood and affect        ASSESSMENT & PLAN  Mr. Dominguez is a 32 year old year old male her with acute low back pain.    I ordered & reviewed an x-ray of his lumbar spine which does show mild to moderate levoscoliosis.  There is no obvious disc space narrowing apparent.    We discussed early management of acute  low back pain with early mobilization, oral analgesics, ice or heat as helpful, and muscle relaxers.  I prescribed [place] Flexeril and naproxen to take as needed.    Joey is going to let me know how he is doing tomorrow, if worsening I did consider oral steroids.  If no better or worsening in the next week to 2 weeks I would likely order advanced imaging.    It was a pleasure seeing Joey.        Santy Shelton, DO, CAQSM

## 2019-07-24 ENCOUNTER — OFFICE VISIT (OUTPATIENT)
Dept: OPTOMETRY | Facility: CLINIC | Age: 34
End: 2019-07-24
Payer: COMMERCIAL

## 2019-07-24 DIAGNOSIS — S05.8X1A: Primary | ICD-10-CM

## 2019-07-24 PROCEDURE — 92002 INTRM OPH EXAM NEW PATIENT: CPT | Performed by: OPTOMETRIST

## 2019-07-24 RX ORDER — FLUOROMETHOLONE 0.1 %
1 SUSPENSION, DROPS(FINAL DOSAGE FORM)(ML) OPHTHALMIC (EYE) 4 TIMES DAILY
Qty: 1 BOTTLE | Refills: 0 | Status: SHIPPED | OUTPATIENT
Start: 2019-07-24 | End: 2019-07-31

## 2019-07-24 ASSESSMENT — VISUAL ACUITY
OS_SC: 20/40
METHOD: SNELLEN - LINEAR
OS_SC+: -1
OD_SC: 20/20

## 2019-07-24 ASSESSMENT — TONOMETRY: OD_IOP_MMHG: 22

## 2019-07-24 ASSESSMENT — SLIT LAMP EXAM - LIDS: COMMENTS: NORMAL

## 2019-07-24 ASSESSMENT — EXTERNAL EXAM - RIGHT EYE: OD_EXAM: NORMAL

## 2019-07-24 ASSESSMENT — EXTERNAL EXAM - LEFT EYE: OS_EXAM: NORMAL

## 2019-07-24 NOTE — PATIENT INSTRUCTIONS
FML- 1 drop right eye 4 x day for 7 days.    Ok to use artificial tears- Blink or Systane up to 4 x day.    Recommend annual eye exams.    Nitish Mcguire, OD

## 2019-07-24 NOTE — PROGRESS NOTES
Chief Complaint   Patient presents with     Eye Pain     Eye Pain   HPI    Eye Pain      Laterality:  In right eye     Quality:  foreign body sensation, irritation, sharp pain, and pain with eye movement     Pain scale:  4/10     Frequency:  intermittently     Duration:  1 week     Course:  gradually worsening     Associated symptoms:  redness, foreign body sensation, photophobia, and headaches     Treatments tried:  eye drops     Response to treatment:  no improvement   Comments      Patient used finger to get gravel/rust out. Patient is a  and something fell in eye when stood up and brushed hair. FB went under safety glasses   Jacqueline Del Cid, Optometric Assistant, A.B.O.C.         Medical, surgical and family histories reviewed and updated 7/24/2019.       OBJECTIVE: See Ophthalmology exam    ASSESSMENT:    ICD-10-CM    1. Superficial trauma of eye, right, initial encounter S05.8X1A fluorometholone (FML LIQUIFILM) 0.1 % ophthalmic suspension      PLAN:     Patient Instructions   FML- 1 drop right eye 4 x day for 7 days.    Ok to use artificial tears- Blink or Systane up to 4 x day.    Recommend annual eye exams.    Nitish Mcguire, OD

## 2020-05-08 ENCOUNTER — NURSE TRIAGE (OUTPATIENT)
Dept: NURSING | Facility: CLINIC | Age: 35
End: 2020-05-08

## 2020-05-08 DIAGNOSIS — Z11.59 SCREENING FOR VIRAL DISEASE: Primary | ICD-10-CM

## 2020-05-08 DIAGNOSIS — Z11.59 SCREENING FOR VIRAL DISEASE: ICD-10-CM

## 2020-05-08 PROCEDURE — 99000 SPECIMEN HANDLING OFFICE-LAB: CPT | Performed by: EMERGENCY MEDICINE

## 2020-05-08 PROCEDURE — 86769 SARS-COV-2 COVID-19 ANTIBODY: CPT | Mod: 90 | Performed by: EMERGENCY MEDICINE

## 2020-05-08 PROCEDURE — 36415 COLL VENOUS BLD VENIPUNCTURE: CPT | Performed by: EMERGENCY MEDICINE

## 2020-05-08 NOTE — TELEPHONE ENCOUNTER
"Patient is calling requesting COVID serologic antibody testing. Spouse Zahra placing call. Consent to communicate is on file 3/21/17. Reporting possible COVID 19 symptoms in 2/2020. Zahra reporting her antibody testing has come back positive.  NOTE: Serologic testing is a blood test for 'antibodies' which are made at 10-14 days after you have had symptoms of COVID or were exposed and had an asymptomatic infection.  This does NOT test you for 'active' infection or tell you if you are contagious.    Are you a healthcare worker?  No  Do you have cough, fever, myalgias, or shortness of breath?  No  Were you exposed to a lab confirmed positive or possible case of COVID-19?  No exposure.    The patient was informed: \"Testing is limited each day and it may take time for testing to be available to everyone who has called.  We will be calling you to schedule testing- please confirm the best number to reach you is .\"    Lab order placed per COVID Serologic Testing standing orders.          Reason for Disposition    [1] COVID-19 EXPOSURE AND [2] 15 or more days ago AND [3] NO cough or fever or breathing difficulty    Additional Information    Negative: SEVERE difficulty breathing (e.g., struggling for each breath, speaks in single words)    Negative: Bluish (or gray) lips or face now    Negative: Sounds like a life-threatening emergency to the triager    Negative: [1] Adult has symptoms of COVID-19 (fever, cough, or SOB) AND [2] lab test positive    Negative: [1] Adult has symptoms of COVID-19 (fever, cough or SOB) AND [2] major community spread where patient lives AND [3] testing not being done for mild symptoms    Negative: [1] Difficulty breathing (shortness of breath) occurs AND [2] onset > 14 days after COVID-19 EXPOSURE (Close Contact) AND [3] no major community spread    Negative: [1] Dry cough occurs AND [2] onset > 14 days after COVID-19 EXPOSURE AND [3] no major community spread    Negative: [1] Wet cough " (i.e., white-yellow, yellow, green, or kenton colored sputum) AND [2] onset > 14 days after COVID-19 EXPOSURE AND [3] no major community spread    Negative: [1] Common cold symptoms AND [2] onset > 14 days after COVID-19 EXPOSURE AND [3] no major community spread    Negative: [1] Difficulty breathing occurs AND [2] within 14 days of COVID-19 EXPOSURE (Close Contact)    Negative: Patient sounds very sick or weak to the triager    Negative: [1] Fever (or feeling feverish) OR cough AND [2] within 14 Days of COVID-19 EXPOSURE (Close Contact)    Negative: [1] Fever (or feeling feverish) OR cough occurs AND [2] within 14 days of travel from another country (international travel)    Negative: [1] Fever (or feeling feverish) OR cough occurs AND [2] within 14 days of travel from a city or area with major community spread    Negative: [1] Fever (or feeling feverish) OR cough occurs AND [2] living in area with major community spread AND [3] testing being done in the community for symptoms    Negative: [1] Mild body aches, chills, diarrhea, headache, runny nose, or sore throat AND [2] within 14 days of COVID-19 EXPOSURE    Negative: [1] COVID-19 EXPOSURE within last 14 days AND [2] NO cough, fever, or breathing difficulty AND [3] exposed 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 healthcare worker who was NOT using all recommended personal protective equipment (i.e., a respirator-N95 mask, eye protection, gloves, and gown)    Negative: [1] COVID-19 EXPOSURE (Close Contact) within last 14 days AND [2] NO cough, fever, or breathing difficulty    Negative: [1] Living in area with major community spread within last 14 days AND [2] NO cough or fever or breathing difficulty    Negative: [1] Travel from area with major community spread or international travel within last 14 days AND [2] NO cough or fever or breathing difficulty    Protocols used: CORONAVIRUS (COVID-19) EXPOSURE-A- 4.22.20

## 2020-05-12 LAB
COVID-19 SPIKE RBD ABY TITER: NORMAL
COVID-19 SPIKE RBD ABY: NEGATIVE

## 2021-12-01 ENCOUNTER — OFFICE VISIT (OUTPATIENT)
Dept: UROLOGY | Facility: CLINIC | Age: 36
End: 2021-12-01
Payer: COMMERCIAL

## 2021-12-01 DIAGNOSIS — Z30.2 ENCOUNTER FOR VASECTOMY: Primary | ICD-10-CM

## 2021-12-01 PROCEDURE — 99203 OFFICE O/P NEW LOW 30 MIN: CPT | Performed by: UROLOGY

## 2021-12-01 NOTE — NURSING NOTE
Joey Dominguez's goals for this visit include:   Chief Complaint   Patient presents with     New Patient     Vasectomy Consult       He requests these members of his care team be copied on today's visit information:     PCP: Librado Howe    Referring Provider:  No referring provider defined for this encounter.    There were no vitals taken for this visit.    Do you need any medication refills at today's visit?     Paulette Stewart LPN on 12/1/2021 at 10:58 AM

## 2021-12-01 NOTE — PROGRESS NOTES
CC: Desires sterilization, consult for vasectomy.    HPI: Joey Dominguez is a 36 year old male with 3 children, and he is intersted in getting a vasectomy for sterilization.      No voiding problems.  No history of  trauma.  No hematuria  Normal sexual function.  No history of bleeding problems.    PMH:   Morbid obesity   No chronic medical problems     FAMILY HX:   Noncontributory    ALLERGIES:      Allergies   Allergen Reactions     Sulfa Drugs      Wasp Venom Protein Unknown     Extreme sleepiness       MEDS:   No prescription medications at this time.      SOCIAL HISTORY:  Social History     Tobacco Use     Smoking status: Current Every Day Smoker     Packs/day: 1.00     Smokeless tobacco: Never Used   Substance Use Topics     Alcohol use: Yes     Drug use: No        REVIEW OF SYMPTOMS:   Denies testicular pain, ED, ejaculatory problems, rashes in the groin, easy bruising or bleeding.   No constitutional, eye, ENT, heart, lung, GI, musculoskeletal, skin, neurologic, psychiatric, endocrine or hematologic complaints.       GENERAL PHYSICAL EXAM:   There were no vitals taken for this visit.     Constitutional: No acute distress. Well nourished.   PSYCH: Normal mood and affect.   NEURO: Normal gait, no focal deficits.   EYES: Anicteric, EOMI, PERR  CARDIOPULMONARY: Breathing non-labored, pulse regular, no peripheral edema.   GI: Abdomen overweight   MUSCULOSKELETAL: Normal limb proportions, no muscle wasting, no contractures.    SKIN: Normal virilized hair distribution, no lesions, warts or rashes over genitalia, abdomen extremities or face.   HEME/LYMPH: No echymosis, no lymphadenopathy in groin, no lymphedema.     EXAM:  Phallus  circumcised, meatus adequate, no plaques palpated.   Testes descended, consistency is normal. No intra-testicular masses.  Epididymes present.  Vas not palpable due to thick skin and thick spermatic cord bilaterally.  TREVOR deferred.      I discussed with him at length the risks  and benefits of the procedure. He understands that this is a sterilization procedure, and not reversible contraception. He understands that reversals, while possible, are not guaranteed to work and fairly complex. I discussed with him the option of sperm cryopreservation.     I stressed that he continues to be fertile in the post-operative period, and that he should continue using other contraceptive methods, such as a condom, until he obtains a semen analysis and we review the results to confirm success of the procedure and infertility. I also stressed to him that recanalization and pregnancy can occur in about 1 per thousand cases, possibly more even after we clear him with a semen analysis showing no motile sperm. I counseled him on the stacy-operative risks of bleeding, infection, pain.  I described to him post-vasectomy pain syndrome that can occur in about 1 to 2% of men undergoing vasectomy.     We also discussed recovery times (typically days if no complications) and post-operative care including use of ice packs, pain medication and wound care.    He agrees to schedule the vasectomy under [facility].    Additional Coding Information:    Problems:  one acute uncomplicated illness or injury    Data Reviewed  Minimal or none    Level of risk:   low risk (e.g., OTC medication or observation, minor surgery without risks)    Time spent:    13 minutes spent on the date of the encounter doing chart review, history and exam, documentation and further activities as noted above.

## 2022-12-06 NOTE — PROGRESS NOTES
[facility] Dermatology Note  Encounter Date: Dec 7, 2022  Office Visit     Dermatology Problem List:  Last skin check 12/7/22  1. Diffuse eruption, concerning for coxsackieviral infection with secondary impetiginization  - Current tx: doxycycline 100 mg BID x 30 days, triamcinolone 0.1% lotion BID  - Prior tx: cephalexin 250 mg x 2 days    ____________________________________________    Assessment & Plan:    1. Diffuse eruption. DDx includes coxsackieviral infection with secondary impetiginization, less likely medication reaction such as AGEP or pustular psoriasis. Other diagnoses under consideration: new onset pemphigus foliaceus. Has been taking cephalexin since Monday. Recommended switching to oral antibiotics for treatment and triamcinolone to reduce redness and irritation. If not responding, consider biopsy and further lab workup.   - Start doxycycline 100 mg BID x 30 days. Reviewed GI upset and sun sensitivity. Recommended taking with food and a full glass of water.   - Apply triamcinolone 0.1% lotion BID on the affected areas.     - If refractory, consider further workup including coxsackievirus serologies, pemphigus panel, skin biopsy.     Procedures Performed:   None.     Follow-up: 4 week(s) in-person for follow up, or earlier for new or changing lesions    Staff and Scribe:     Scribe Disclosure:   I, Julio Jaiem, am serving as a scribe to document services personally performed by this physician, Dr. Santy Adams, based on data collection and the provider's statements to me.       Provider Disclosure:   The documentation recorded by the scribe accurately reflects the services I personally performed and the decisions made by me.    Santy Adams MD   of Dermatology  Department of Dermatology  [facility]      ____________________________________________    CC: Derm Problem (Red, painful crusty patches on face and scalp, has  "spread, chest, back)    HPI:  Mr. Joey Dominguez is a(n) 37 year old male who presents today as a new patient for a rash.     Self referred.     Today, she notes red, painful spots on the face and scalp that are spreading to the chest and back and hands. He reports that the back of his neck was itchy on Friday, then 2 \"white heads\" on the face on Saturday, and by Sunday there were \"hundreds\" that were painful. He had a virtual visit on Monday and the physician believed it was impetigo. He reports that several developed on the hands and toes that feel like \"nerve tingling\". He denies any new medications or new exposures. Admits his son had pink eye recently. He also reported \"blistering\" on the tongue and sores int he mouth.     Patient is otherwise feeling well, without additional skin concerns.    Labs Reviewed:  N/A    Physical Exam:  Vitals: There were no vitals taken for this visit.  SKIN: Waist-up skin, which includes the head/face, neck, both arms, chest, back, abdomen, digits and/or nails was examined.  - Innumerable erythematous crusted papules most notable over face, scalp, upper torso, palms, and soles.  - Superficial erosion on the tongue and conjunctival injection.  - There are dome shaped bright red papules on the trunk and extremities.   - Multiple regular brown pigmented macules and papules are identified on the trunk and extremities.   - Scattered brown macules on sun exposed areas.  - There are waxy stuck on tan to brown papules on the trunk and extremities.    - No other lesions of concern on areas examined.     Medications:  Current Outpatient Medications   Medication     cephalexin 250 MG TABS     citalopram (CELEXA) 10 MG tablet     cyclobenzaprine (FLEXERIL) 10 MG tablet     naproxen (NAPROSYN) 500 MG tablet     No current facility-administered medications for this visit.      Past Medical History:   Patient Active Problem List   Diagnosis     CARDIOVASCULAR SCREENING; LDL GOAL LESS THAN " 160     Tobacco abuse     Anal fistula     No past medical history on file.     CC No referring provider defined for this encounter. on close of this encounter.

## 2022-12-07 ENCOUNTER — OFFICE VISIT (OUTPATIENT)
Dept: DERMATOLOGY | Facility: CLINIC | Age: 37
End: 2022-12-07
Payer: COMMERCIAL

## 2022-12-07 DIAGNOSIS — D18.01 CHERRY ANGIOMA: ICD-10-CM

## 2022-12-07 DIAGNOSIS — L82.1 SEBORRHEIC KERATOSES: ICD-10-CM

## 2022-12-07 DIAGNOSIS — D22.9 MULTIPLE MELANOCYTIC NEVI: ICD-10-CM

## 2022-12-07 DIAGNOSIS — L81.4 SOLAR LENTIGO: ICD-10-CM

## 2022-12-07 DIAGNOSIS — L30.9 DERMATITIS: Primary | ICD-10-CM

## 2022-12-07 DIAGNOSIS — D23.9 DERMATOFIBROMA: ICD-10-CM

## 2022-12-07 PROCEDURE — 99203 OFFICE O/P NEW LOW 30 MIN: CPT | Performed by: DERMATOLOGY

## 2022-12-07 RX ORDER — DOXYCYCLINE 100 MG/1
100 CAPSULE ORAL 2 TIMES DAILY
Qty: 60 CAPSULE | Refills: 3 | Status: SHIPPED | OUTPATIENT
Start: 2022-12-07

## 2022-12-07 RX ORDER — TRIAMCINOLONE ACETONIDE 1 MG/ML
LOTION TOPICAL 2 TIMES DAILY
Qty: 180 ML | Refills: 3 | Status: SHIPPED | OUTPATIENT
Start: 2022-12-07

## 2022-12-07 RX ORDER — TRIAMCINOLONE ACETONIDE 1 MG/G
CREAM TOPICAL 2 TIMES DAILY
Status: CANCELLED | OUTPATIENT
Start: 2022-12-07

## 2022-12-07 RX ORDER — DOXYCYCLINE 100 MG/1
100 CAPSULE ORAL 2 TIMES DAILY
Status: CANCELLED | OUTPATIENT
Start: 2022-12-07

## 2022-12-07 RX ORDER — CEPHALEXIN 250 MG/1
250 TABLET ORAL 4 TIMES DAILY
COMMUNITY

## 2022-12-07 NOTE — LETTER
12/7/2022         RE: Joey Dominguez  [address] [place] [facility] [address]        Dear Colleague,    Thank you for referring your patient, Joey Dominguez, to the [facility]. Please see a copy of my visit note below.    [facility] Dermatology Note  Encounter Date: Dec 7, 2022  Office Visit     Dermatology Problem List:  Last skin check 12/7/22  1. Diffuse eruption, concerning for coxsackieviral infection with secondary impetiginization  - Current tx: doxycycline 100 mg BID x 30 days, triamcinolone 0.1% lotion BID  - Prior tx: cephalexin 250 mg x 2 days    ____________________________________________    Assessment & Plan:    1. Diffuse eruption. DDx includes coxsackieviral infection with secondary impetiginization, less likely medication reaction such as AGEP or pustular psoriasis. Other diagnoses under consideration: new onset pemphigus foliaceus. Has been taking cephalexin since Monday. Recommended switching to oral antibiotics for treatment and triamcinolone to reduce redness and irritation. If not responding, consider biopsy and further lab workup.   - Start doxycycline 100 mg BID x 30 days. Reviewed GI upset and sun sensitivity. Recommended taking with food and a full glass of water.   - Apply triamcinolone 0.1% lotion BID on the affected areas.     - If refractory, consider further workup including coxsackievirus serologies, pemphigus panel, skin biopsy.     Procedures Performed:   None.     Follow-up: 4 week(s) in-person for follow up, or earlier for new or changing lesions    Staff and Scribe:     Scribe Disclosure:   I, Julio Jaime, am serving as a scribe to document services personally performed by this physician, Dr. Santy Adams, based on data collection and the provider's statements to me.       Provider Disclosure:   The documentation recorded by the scribe accurately reflects the services I personally performed and the decisions made by  "me.    Santy Adams MD   of Dermatology  Department of Dermatology  [facility]      ____________________________________________    CC: Derm Problem (Red, painful crusty patches on face and scalp, has spread, chest, back)    HPI:  Mr. Joey Dominguez is a(n) 37 year old male who presents today as a new patient for a rash.     Self referred.     Today, she notes red, painful spots on the face and scalp that are spreading to the chest and back and hands. He reports that the back of his neck was itchy on Friday, then 2 \"white heads\" on the face on Saturday, and by Sunday there were \"hundreds\" that were painful. He had a virtual visit on Monday and the physician believed it was impetigo. He reports that several developed on the hands and toes that feel like \"nerve tingling\". He denies any new medications or new exposures. Admits his son had pink eye recently. He also reported \"blistering\" on the tongue and sores int he mouth.     Patient is otherwise feeling well, without additional skin concerns.    Labs Reviewed:  N/A    Physical Exam:  Vitals: There were no vitals taken for this visit.  SKIN: Waist-up skin, which includes the head/face, neck, both arms, chest, back, abdomen, digits and/or nails was examined.  - Innumerable erythematous crusted papules most notable over face, scalp, upper torso, palms, and soles.  - Superficial erosion on the tongue and conjunctival injection.  - There are dome shaped bright red papules on the trunk and extremities.   - Multiple regular brown pigmented macules and papules are identified on the trunk and extremities.   - Scattered brown macules on sun exposed areas.  - There are waxy stuck on tan to brown papules on the trunk and extremities.    - No other lesions of concern on areas examined.     Medications:  Current Outpatient Medications   Medication     cephalexin 250 MG TABS     citalopram (CELEXA) 10 MG tablet     " cyclobenzaprine (FLEXERIL) 10 MG tablet     naproxen (NAPROSYN) 500 MG tablet     No current facility-administered medications for this visit.      Past Medical History:   Patient Active Problem List   Diagnosis     CARDIOVASCULAR SCREENING; LDL GOAL LESS THAN 160     Tobacco abuse     Anal fistula     No past medical history on file.     CC No referring provider defined for this encounter. on close of this encounter.      Again, thank you for allowing me to participate in the care of your patient.        Sincerely,        Santy Adams MD

## 2022-12-07 NOTE — NURSING NOTE
Joey Dominguez's goals for this visit include:   Chief Complaint   Patient presents with     Derm Problem     Red, painful crusty patches on face and scalp, has spread, chest, back       He requests these members of his care team be copied on today's visit information: no    PCP: Librado Howe    Referring Provider:  No referring provider defined for this encounter.    There were no vitals taken for this visit.    Do you need any medication refills at today's visit? No    Shauna Barreto LPN

## 2022-12-07 NOTE — PATIENT INSTRUCTIONS
Patient Education     Checking for Skin Cancer  You can find cancer early by checking your skin each month. There are 3 kinds of skin cancer. They are melanoma, basal cell carcinoma, and squamous cell carcinoma. Doing monthly skin checks is the best way to find new marks or skin changes. Follow the instructions below for checking your skin.   The ABCDEs of checking moles for melanoma   Check your moles or growths for signs of melanoma using ABCDE:   Asymmetry: the sides of the mole or growth don t match  Border: the edges are ragged, notched, or blurred  Color: the color within the mole or growth varies  Diameter: the mole or growth is larger than 6 mm (size of a pencil eraser)  Evolving: the size, shape, or color of the mole or growth is changing (evolving is not shown in the images below)    Checking for other types of skin cancer  Basal cell carcinoma or squamous cell carcinoma have symptoms such as:     A spot or mole that looks different from all other marks on your skin  Changes in how an area feels, such as itching, tenderness, or pain  Changes in the skin's surface, such as oozing, bleeding, or scaliness  A sore that does not heal  New swelling or redness beyond the border of a mole    Who s at risk?  Anyone can get skin cancer. But you are at greater risk if you have:   Fair skin, light-colored hair, or light-colored eyes  Many moles or abnormal moles on your skin  A history of sunburns from sunlight or tanning beds  A family history of skin cancer  A history of exposure to radiation or chemicals  A weakened immune system  If you have had skin cancer in the past, you are at risk for recurring skin cancer.   How to check your skin  Do your monthly skin checkups in front of a full-length mirror. Check all parts of your body, including your:   Head (ears, face, neck, and scalp)  Torso (front, back, and sides)  Arms (tops, undersides, upper, and lower armpits)  Hands (palms, backs, and fingers, including  under the nails)  Buttocks and genitals  Legs (front, back, and sides)  Feet (tops, soles, toes, including under the nails, and between toes)  If you have a lot of moles, take digital photos of them each month. Make sure to take photos both up close and from a distance. These can help you see if any moles change over time.   Most skin changes are not cancer. But if you see any changes in your skin, call your doctor right away. Only he or she can diagnose a problem. If you have skin cancer, seeing your doctor can be the first step toward getting the treatment that could save your life.   Kanshu last reviewed this educational content on 4/1/2019 2000-2020 [employer]. [address] [place] [address], [place] [address]. All rights reserved. This information is not intended as a substitute for professional medical care. Always follow your healthcare professional's instructions.       When should I call my doctor?  If you are worsening or not improving, please, contact us or seek urgent care as noted below.     Who should I call with questions (adults)?  [facility] (adult and pediatric): [phone]  [facility] (adult): [phone]  For urgent needs outside of business hours call the [facility] at [phone] and ask for the dermatology resident on call to be paged  If this is a medical emergency and you are unable to reach an ER, Call [phone]    Who should I call with questions (pediatric)?  [facility]- Pediatric Dermatology  Dr. Angelina Goff, Dr. Kenneth Juarez, Dr. Anju Mcguire, DANIEL Shin, Dr. Carlota Moreira, Dr. Carito Samson & Dr. Santy Elizabeth  Non-urgent nurse triage line; [phone]- Ania and Tiffanie FENG Care Coordinatorjaymie Sood (/Complex ) [phone]    If you need a prescription refill, please contact your pharmacy. Refills are approved or denied by our  Physicians during normal business hours, Monday through Fridays  Per office policy, refills will not be granted if you have not been seen within the past year (or sooner depending on your child's condition)    Scheduling Information:  Pediatric Appointment Scheduling and Call Center [phone]  Radiology Scheduling- [phone]  Sedation Unit Scheduling- [phone]  [place] Scheduling- General [phone]; Pediatric Dermatology [phone]  Main  Services: [phone]  Croatian: [phone]  Tongan: [phone]  Hmong/Greenlandic/Yakut: [phone]  Preadmission Nursing Department Fax Number: [phone] (Fax all pre-operative paperwork to this number)    For urgent matters arising during evenings, weekends, or holidays that cannot wait for normal business hours please call [phone] and ask for the dermatology resident on call to be paged.

## 2023-01-08 ENCOUNTER — HEALTH MAINTENANCE LETTER (OUTPATIENT)
Age: 38
End: 2023-01-08

## 2023-04-20 ENCOUNTER — MYC MEDICAL ADVICE (OUTPATIENT)
Dept: UROLOGY | Facility: CLINIC | Age: 38
End: 2023-04-20
Payer: COMMERCIAL

## 2023-04-20 ENCOUNTER — TELEPHONE (OUTPATIENT)
Dept: UROLOGY | Facility: CLINIC | Age: 38
End: 2023-04-20
Payer: COMMERCIAL

## 2023-04-20 NOTE — TELEPHONE ENCOUNTER
Lucio Padilla MD Bratsch, Angie J, RN  Phone Number: [phone]     Needs blood draw, orders are in - pt notified of plan via [employer] .'   I will send results on [employer].     SHAWN

## 2023-04-21 ENCOUNTER — TELEPHONE (OUTPATIENT)
Dept: UROLOGY | Facility: CLINIC | Age: 38
End: 2023-04-21
Payer: COMMERCIAL

## 2023-04-21 ENCOUNTER — HOSPITAL ENCOUNTER (OUTPATIENT)
Facility: AMBULATORY SURGERY CENTER | Age: 38
End: 2023-04-21
Attending: UROLOGY | Admitting: UROLOGY
Payer: COMMERCIAL

## 2023-04-21 DIAGNOSIS — Z30.2 ENCOUNTER FOR VASECTOMY: ICD-10-CM

## 2023-04-21 DIAGNOSIS — Z30.2 ENCOUNTER FOR VASECTOMY: Primary | ICD-10-CM

## 2023-04-21 RX ORDER — CEFAZOLIN SODIUM IN 0.9 % NACL 3 G/100 ML
3 INTRAVENOUS SOLUTION, PIGGYBACK (ML) INTRAVENOUS
Status: CANCELLED | OUTPATIENT
Start: 2023-04-21

## 2023-04-21 RX ORDER — CEFAZOLIN SODIUM IN 0.9 % NACL 3 G/100 ML
3 INTRAVENOUS SOLUTION, PIGGYBACK (ML) INTRAVENOUS SEE ADMIN INSTRUCTIONS
Status: CANCELLED | OUTPATIENT
Start: 2023-04-21

## 2023-04-21 NOTE — CONFIDENTIAL NOTE
Received updated message from Dr. Padilla who reports that additional labs are not needed and patient can proceed with scheduling vasectomy.     Per chart review, plan is for vasectomy under [facility] at  [facility] however updated surgery orders are needed. Message sent to Dr. Padilla.    Naveedt message sent to patient.    Meg Bangura RN, BSN

## 2023-04-21 NOTE — CONFIDENTIAL NOTE
Dr. Padilla placed the following Case Request: VASECTOMY.    Message sent to surgery scheduler with request to contact patient for scheduling.    Meg Bangura RN, BSN

## 2023-04-21 NOTE — TELEPHONE ENCOUNTER
Scheduled surgery for 6/7 in [place] with Dr. Padilla.    H&P with PCP within 30 days of the surgery date. Patient verbalized understanding H&P is needed.    Writer will mail surgery packet.    No further questions/concerns at this time.

## 2023-04-21 NOTE — CONFIDENTIAL NOTE
Patient has been contacted and is scheduled for vasectomy at [facility] on 6/7/23. See 4/21/23 telephone encounter.    Meg Bangura RN, BSN

## 2023-04-25 ENCOUNTER — TELEPHONE (OUTPATIENT)
Dept: UROLOGY | Facility: CLINIC | Age: 38
End: 2023-04-25

## 2023-04-25 NOTE — TELEPHONE ENCOUNTER
Nurse spoke with patients wife. Verified consent to communicate was on file. Per message from our surgery scheduling, his wife was wondering if the patient needed to take off work the day after the procedure. Nurse stated that he will want to rest for the first 48 hours and to keep in mind that there is a 20lb max lifting restriction for one week. Due to the patient being a  the nurse suggested taking Thursday and Friday and return to work on Monday June 12th. No further questions at this time.     Diana Ramsey LPN on 4/25/2023 at 12:15 PM

## 2023-05-24 ENCOUNTER — DOCUMENTATION ONLY (OUTPATIENT)
Dept: UROLOGY | Facility: CLINIC | Age: 38
End: 2023-05-24
Payer: COMMERCIAL

## 2023-05-24 NOTE — PROGRESS NOTES
Pre-OP/ H&P stat scanned in on 5/24/23.copy kept in clinic.       Silvana Candelaria LPN on 5/24/2023 at 1:27 PM

## 2023-05-31 ENCOUNTER — TELEPHONE (OUTPATIENT)
Dept: UROLOGY | Facility: CLINIC | Age: 38
End: 2023-05-31
Payer: COMMERCIAL

## 2023-05-31 VITALS — WEIGHT: 315 LBS | HEIGHT: 74 IN | BODY MASS INDEX: 40.43 KG/M2

## 2023-05-31 NOTE — TELEPHONE ENCOUNTER
Surgery cancelled due to patients BMI. Spoke with patients wife to reschedule to [place] but they will discuss further at another time and let writer know if they want to move forward.

## 2024-02-10 ENCOUNTER — HEALTH MAINTENANCE LETTER (OUTPATIENT)
Age: 39
End: 2024-02-10

## 2024-09-06 ENCOUNTER — TELEPHONE (OUTPATIENT)
Dept: UROLOGY | Facility: CLINIC | Age: 39
End: 2024-09-06
Payer: COMMERCIAL

## 2024-09-06 NOTE — TELEPHONE ENCOUNTER
Called patient to schedule surgery with Dr. Padilla    Spoke with:  Patients wifeZahra    Date of Surgery: 12/3/24    Arrival time Discussed with Patient:  No    [facility]: [facility]     Pre-Op H&P: With PCP clinic [facility]     Post-Op Appts: N/A per provider    Discussed with patient pre-op RN will call 2-3 days prior to surgery with arrival time and instructions:  Yes     Packet sent out: Yes  via Mail - Standard [DATE] 9/6/24      Informed patient that they will need an adult  to bring patient home from surgery: Yes  : Zahra Velarde       Additional Comments:  N/A      All patients questions were answered and patient was instructed to review surgical packet and call back with any questions or concerns.       Hallie Barton on 9/6/2024 at 10:31 AM

## 2024-11-27 DIAGNOSIS — Z30.2 ENCOUNTER FOR VASECTOMY: Primary | ICD-10-CM

## 2024-11-27 RX ORDER — ACETAMINOPHEN 325 MG/1
975 TABLET ORAL ONCE
OUTPATIENT
Start: 2024-11-27 | End: 2024-11-27

## 2024-12-02 ENCOUNTER — ANESTHESIA EVENT (OUTPATIENT)
Dept: SURGERY | Facility: CLINIC | Age: 39
End: 2024-12-02
Payer: COMMERCIAL

## 2024-12-02 NOTE — ANESTHESIA PREPROCEDURE EVALUATION
Anesthesia Pre-Procedure Evaluation    Patient: Joey Dominguez   MRN: [id number] : 1985        Procedure : Procedure(s):  VASECTOMY          History reviewed. No pertinent past medical history.   History reviewed. No pertinent surgical history.   Allergies   Allergen Reactions    Sulfa Antibiotics     Wasp Venom Protein Unknown     Extreme sleepiness      Social History     Tobacco Use    Smoking status: Every Day     Current packs/day: 1.00     Types: Cigarettes    Smokeless tobacco: Never   Substance Use Topics    Alcohol use: Not Currently     Comment: Sober since 2018      Wt Readings from Last 1 Encounters:   23 (!) 175.5 kg (386 lb 14.5 oz)        Anesthesia Evaluation   Pt has not had prior anesthetic         ROS/MED HX  ENT/Pulmonary:  - neg pulmonary ROS   (+)                tobacco use, Current use, 1 packs/day, 14  Pack-Year Hx,                      Neurologic:  - neg neurologic ROS     Cardiovascular:  - neg cardiovascular ROS     METS/Exercise Tolerance:     Hematologic:  - neg hematologic  ROS     Musculoskeletal:  - neg musculoskeletal ROS     GI/Hepatic:  - neg GI/hepatic ROS     Renal/Genitourinary:  - neg Renal ROS     Endo:     (+)               Obesity,       Psychiatric/Substance Use:  - neg psychiatric ROS     Infectious Disease:  - neg infectious disease ROS     Malignancy:  - neg malignancy ROS     Other:  - neg other ROS          Physical Exam    Airway        Mallampati: III   TM distance: > 3 FB   Neck ROM: full   Mouth opening: > 3 cm    Respiratory Devices and Support         Dental       (+) Minor Abnormalities - some fillings, tiny chips      Cardiovascular   cardiovascular exam normal          Pulmonary   pulmonary exam normal                OUTSIDE LABS:  CBC:   Lab Results   Component Value Date    WBC 8.2 2017    HGB 18.0 (H) 2017    HGB 16.9 12/10/2013    HCT 50.2 2017     2017     BMP:   Lab Results   Component Value Date     " 08/30/2017    POTASSIUM 3.6 08/30/2017    CHLORIDE 96 08/30/2017    CO2 25 08/30/2017    BUN 5 (L) 08/30/2017    CR 0.83 08/30/2017     (H) 08/30/2017     COAGS: No results found for: \"PTT\", \"INR\", \"FIBR\"  POC: No results found for: \"BGM\", \"HCG\", \"HCGS\"  HEPATIC: No results found for: \"ALBUMIN\", \"PROTTOTAL\", \"ALT\", \"AST\", \"GGT\", \"ALKPHOS\", \"BILITOTAL\", \"BILIDIRECT\", \"ANTONINA\"  OTHER:   Lab Results   Component Value Date    A1C 5.1 08/30/2017    JARED 9.5 08/30/2017    TSH 2.72 08/30/2017       Anesthesia Plan    ASA Status:  3    NPO Status:  NPO Appropriate    Anesthesia Type: General.     - Airway: ETT   Induction: Intravenous.           Consents          - Extended Intubation/Ventilatory Support Discussed: No.      - Patient is DNR/DNI Status: No     Use of blood products discussed: No .     Postoperative Care    Pain management: Multi-modal analgesia.   PONV prophylaxis: Ondansetron (or other 5HT-3), Dexamethasone or Solumedrol     Comments:    Other Comments: Patient says MAC but listed as a GA in a few places. Will clarify with surgeon. Surgeon prefers a GA           Clarissa Rios MD    I have reviewed the pertinent notes and labs in the chart from the past 30 days and (re)examined the patient.  Any updates or changes from those notes are reflected in this note.                                   "

## 2024-12-03 ENCOUNTER — ANESTHESIA (OUTPATIENT)
Dept: SURGERY | Facility: CLINIC | Age: 39
End: 2024-12-03
Payer: COMMERCIAL

## 2024-12-03 ENCOUNTER — HOSPITAL ENCOUNTER (OUTPATIENT)
Facility: CLINIC | Age: 39
Discharge: HOME | End: 2024-12-03
Attending: UROLOGY | Admitting: UROLOGY
Payer: COMMERCIAL

## 2024-12-03 VITALS
TEMPERATURE: 97.5 F | BODY MASS INDEX: 40.43 KG/M2 | HEART RATE: 87 BPM | DIASTOLIC BLOOD PRESSURE: 90 MMHG | HEIGHT: 74 IN | WEIGHT: 315 LBS | RESPIRATION RATE: 11 BRPM | SYSTOLIC BLOOD PRESSURE: 137 MMHG | OXYGEN SATURATION: 95 %

## 2024-12-03 PROCEDURE — 250N000013 HC RX MED GY IP 250 OP 250 PS 637: Performed by: UROLOGY

## 2024-12-03 PROCEDURE — 370N000017 HC ANESTHESIA TECHNICAL FEE, PER MIN: Performed by: UROLOGY

## 2024-12-03 PROCEDURE — 250N000009 HC RX 250: Performed by: NURSE ANESTHETIST, CERTIFIED REGISTERED

## 2024-12-03 PROCEDURE — 250N000011 HC RX IP 250 OP 636: Performed by: UROLOGY

## 2024-12-03 PROCEDURE — 250N000011 HC RX IP 250 OP 636: Performed by: NURSE ANESTHETIST, CERTIFIED REGISTERED

## 2024-12-03 PROCEDURE — 258N000003 HC RX IP 258 OP 636: Performed by: NURSE ANESTHETIST, CERTIFIED REGISTERED

## 2024-12-03 PROCEDURE — 710N000010 HC RECOVERY PHASE 1, LEVEL 2, PER MIN: Performed by: UROLOGY

## 2024-12-03 PROCEDURE — 272N000001 HC OR GENERAL SUPPLY STERILE: Performed by: UROLOGY

## 2024-12-03 PROCEDURE — 999N000127 HC STATISTIC PERIPHERAL IV START W US GUIDANCE

## 2024-12-03 PROCEDURE — 250N000009 HC RX 250: Performed by: UROLOGY

## 2024-12-03 PROCEDURE — 999N000141 HC STATISTIC PRE-PROCEDURE NURSING ASSESSMENT: Performed by: UROLOGY

## 2024-12-03 PROCEDURE — 710N000012 HC RECOVERY PHASE 2, PER MINUTE: Performed by: UROLOGY

## 2024-12-03 PROCEDURE — 999N000040 HC STATISTIC CONSULT NO CHARGE VASC ACCESS

## 2024-12-03 PROCEDURE — 250N000025 HC SEVOFLURANE, PER MIN: Performed by: UROLOGY

## 2024-12-03 PROCEDURE — 360N000074 HC SURGERY LEVEL 1, PER MIN: Performed by: UROLOGY

## 2024-12-03 RX ORDER — ACETAMINOPHEN 650 MG/1
650 SUPPOSITORY RECTAL ONCE
Status: COMPLETED | OUTPATIENT
Start: 2024-12-03 | End: 2024-12-03

## 2024-12-03 RX ORDER — DEXAMETHASONE SODIUM PHOSPHATE 4 MG/ML
INJECTION, SOLUTION INTRA-ARTICULAR; INTRALESIONAL; INTRAMUSCULAR; INTRAVENOUS; SOFT TISSUE PRN
Status: DISCONTINUED | OUTPATIENT
Start: 2024-12-03 | End: 2024-12-03

## 2024-12-03 RX ORDER — ONDANSETRON 2 MG/ML
4 INJECTION INTRAMUSCULAR; INTRAVENOUS EVERY 30 MIN PRN
Status: DISCONTINUED | OUTPATIENT
Start: 2024-12-03 | End: 2024-12-03 | Stop reason: HOSPADM

## 2024-12-03 RX ORDER — DEXAMETHASONE SODIUM PHOSPHATE 4 MG/ML
4 INJECTION, SOLUTION INTRA-ARTICULAR; INTRALESIONAL; INTRAMUSCULAR; INTRAVENOUS; SOFT TISSUE
Status: DISCONTINUED | OUTPATIENT
Start: 2024-12-03 | End: 2024-12-03 | Stop reason: HOSPADM

## 2024-12-03 RX ORDER — ACETAMINOPHEN 325 MG/1
975 TABLET ORAL ONCE
Status: COMPLETED | OUTPATIENT
Start: 2024-12-03 | End: 2024-12-03

## 2024-12-03 RX ORDER — LIDOCAINE HYDROCHLORIDE 20 MG/ML
INJECTION, SOLUTION INFILTRATION; PERINEURAL PRN
Status: DISCONTINUED | OUTPATIENT
Start: 2024-12-03 | End: 2024-12-03

## 2024-12-03 RX ORDER — CEFAZOLIN SODIUM/WATER 3 G/30 ML
3 SYRINGE (ML) INTRAVENOUS
Status: COMPLETED | OUTPATIENT
Start: 2024-12-03 | End: 2024-12-03

## 2024-12-03 RX ORDER — PROPOFOL 10 MG/ML
INJECTION, EMULSION INTRAVENOUS PRN
Status: DISCONTINUED | OUTPATIENT
Start: 2024-12-03 | End: 2024-12-03

## 2024-12-03 RX ORDER — NALOXONE HYDROCHLORIDE 0.4 MG/ML
0.1 INJECTION, SOLUTION INTRAMUSCULAR; INTRAVENOUS; SUBCUTANEOUS
Status: DISCONTINUED | OUTPATIENT
Start: 2024-12-03 | End: 2024-12-03 | Stop reason: HOSPADM

## 2024-12-03 RX ORDER — FENTANYL CITRATE 50 UG/ML
25 INJECTION, SOLUTION INTRAMUSCULAR; INTRAVENOUS EVERY 5 MIN PRN
Status: DISCONTINUED | OUTPATIENT
Start: 2024-12-03 | End: 2024-12-03 | Stop reason: HOSPADM

## 2024-12-03 RX ORDER — KETAMINE HYDROCHLORIDE 10 MG/ML
INJECTION INTRAMUSCULAR; INTRAVENOUS PRN
Status: DISCONTINUED | OUTPATIENT
Start: 2024-12-03 | End: 2024-12-03

## 2024-12-03 RX ORDER — ONDANSETRON 2 MG/ML
INJECTION INTRAMUSCULAR; INTRAVENOUS PRN
Status: DISCONTINUED | OUTPATIENT
Start: 2024-12-03 | End: 2024-12-03

## 2024-12-03 RX ORDER — BUPIVACAINE HYDROCHLORIDE 5 MG/ML
INJECTION, SOLUTION EPIDURAL; INTRACAUDAL PRN
Status: DISCONTINUED | OUTPATIENT
Start: 2024-12-03 | End: 2024-12-03 | Stop reason: HOSPADM

## 2024-12-03 RX ORDER — HYDROMORPHONE HYDROCHLORIDE 1 MG/ML
0.2 INJECTION, SOLUTION INTRAMUSCULAR; INTRAVENOUS; SUBCUTANEOUS EVERY 5 MIN PRN
Status: DISCONTINUED | OUTPATIENT
Start: 2024-12-03 | End: 2024-12-03 | Stop reason: HOSPADM

## 2024-12-03 RX ORDER — BACITRACIN ZINC 500 [USP'U]/G
OINTMENT TOPICAL PRN
Status: DISCONTINUED | OUTPATIENT
Start: 2024-12-03 | End: 2024-12-03 | Stop reason: HOSPADM

## 2024-12-03 RX ORDER — FENTANYL CITRATE 50 UG/ML
50 INJECTION, SOLUTION INTRAMUSCULAR; INTRAVENOUS EVERY 5 MIN PRN
Status: DISCONTINUED | OUTPATIENT
Start: 2024-12-03 | End: 2024-12-03 | Stop reason: HOSPADM

## 2024-12-03 RX ORDER — HYDROMORPHONE HYDROCHLORIDE 1 MG/ML
0.4 INJECTION, SOLUTION INTRAMUSCULAR; INTRAVENOUS; SUBCUTANEOUS EVERY 5 MIN PRN
Status: DISCONTINUED | OUTPATIENT
Start: 2024-12-03 | End: 2024-12-03 | Stop reason: HOSPADM

## 2024-12-03 RX ORDER — ONDANSETRON 4 MG/1
4 TABLET, ORALLY DISINTEGRATING ORAL EVERY 30 MIN PRN
Status: DISCONTINUED | OUTPATIENT
Start: 2024-12-03 | End: 2024-12-03 | Stop reason: HOSPADM

## 2024-12-03 RX ORDER — CEFAZOLIN SODIUM/WATER 3 G/30 ML
3 SYRINGE (ML) INTRAVENOUS SEE ADMIN INSTRUCTIONS
Status: DISCONTINUED | OUTPATIENT
Start: 2024-12-03 | End: 2024-12-03 | Stop reason: HOSPADM

## 2024-12-03 RX ORDER — FENTANYL CITRATE 50 UG/ML
INJECTION, SOLUTION INTRAMUSCULAR; INTRAVENOUS PRN
Status: DISCONTINUED | OUTPATIENT
Start: 2024-12-03 | End: 2024-12-03

## 2024-12-03 RX ORDER — SODIUM CHLORIDE, SODIUM LACTATE, POTASSIUM CHLORIDE, CALCIUM CHLORIDE 600; 310; 30; 20 MG/100ML; MG/100ML; MG/100ML; MG/100ML
INJECTION, SOLUTION INTRAVENOUS CONTINUOUS
Status: DISCONTINUED | OUTPATIENT
Start: 2024-12-03 | End: 2024-12-03 | Stop reason: HOSPADM

## 2024-12-03 RX ORDER — SODIUM CHLORIDE, SODIUM LACTATE, POTASSIUM CHLORIDE, CALCIUM CHLORIDE 600; 310; 30; 20 MG/100ML; MG/100ML; MG/100ML; MG/100ML
INJECTION, SOLUTION INTRAVENOUS CONTINUOUS PRN
Status: DISCONTINUED | OUTPATIENT
Start: 2024-12-03 | End: 2024-12-03

## 2024-12-03 RX ADMIN — MIDAZOLAM 2 MG: 1 INJECTION INTRAMUSCULAR; INTRAVENOUS at 08:44

## 2024-12-03 RX ADMIN — ONDANSETRON 4 MG: 2 INJECTION INTRAMUSCULAR; INTRAVENOUS at 08:44

## 2024-12-03 RX ADMIN — DEXAMETHASONE SODIUM PHOSPHATE 8 MG: 4 INJECTION, SOLUTION INTRAMUSCULAR; INTRAVENOUS at 09:03

## 2024-12-03 RX ADMIN — DEXMEDETOMIDINE HYDROCHLORIDE 12 MCG: 100 INJECTION, SOLUTION INTRAVENOUS at 09:10

## 2024-12-03 RX ADMIN — PROPOFOL 300 MG: 10 INJECTION, EMULSION INTRAVENOUS at 08:54

## 2024-12-03 RX ADMIN — SUGAMMADEX 200 MG: 100 INJECTION, SOLUTION INTRAVENOUS at 09:56

## 2024-12-03 RX ADMIN — Medication 20 MG: at 09:10

## 2024-12-03 RX ADMIN — PHENYLEPHRINE HYDROCHLORIDE 100 MCG: 10 INJECTION INTRAVENOUS at 09:46

## 2024-12-03 RX ADMIN — SUCCINYLCHOLINE CHLORIDE 200 MG: 20 INJECTION, SOLUTION INTRAMUSCULAR; INTRAVENOUS; PARENTERAL at 08:54

## 2024-12-03 RX ADMIN — FENTANYL CITRATE 100 MCG: 50 INJECTION INTRAMUSCULAR; INTRAVENOUS at 09:12

## 2024-12-03 RX ADMIN — PROPOFOL 50 MG: 10 INJECTION, EMULSION INTRAVENOUS at 09:10

## 2024-12-03 RX ADMIN — ACETAMINOPHEN 975 MG: 325 TABLET, FILM COATED ORAL at 07:14

## 2024-12-03 RX ADMIN — PHENYLEPHRINE HYDROCHLORIDE 100 MCG: 10 INJECTION INTRAVENOUS at 09:31

## 2024-12-03 RX ADMIN — Medication 20 MG: at 09:14

## 2024-12-03 RX ADMIN — PHENYLEPHRINE HYDROCHLORIDE 100 MCG: 10 INJECTION INTRAVENOUS at 09:34

## 2024-12-03 RX ADMIN — FENTANYL CITRATE 50 MCG: 50 INJECTION INTRAMUSCULAR; INTRAVENOUS at 09:06

## 2024-12-03 RX ADMIN — LIDOCAINE HYDROCHLORIDE 100 MG: 20 INJECTION, SOLUTION INFILTRATION; PERINEURAL at 08:54

## 2024-12-03 RX ADMIN — Medication 30 MG: at 08:54

## 2024-12-03 RX ADMIN — FENTANYL CITRATE 50 MCG: 50 INJECTION INTRAMUSCULAR; INTRAVENOUS at 08:59

## 2024-12-03 RX ADMIN — Medication 3 G: at 09:03

## 2024-12-03 RX ADMIN — SODIUM CHLORIDE, POTASSIUM CHLORIDE, SODIUM LACTATE AND CALCIUM CHLORIDE: 600; 310; 30; 20 INJECTION, SOLUTION INTRAVENOUS at 08:44

## 2024-12-03 RX ADMIN — DEXMEDETOMIDINE HYDROCHLORIDE 8 MCG: 100 INJECTION, SOLUTION INTRAVENOUS at 09:50

## 2024-12-03 ASSESSMENT — ACTIVITIES OF DAILY LIVING (ADL)
ADLS_ACUITY_SCORE: 32

## 2024-12-03 ASSESSMENT — LIFESTYLE VARIABLES: TOBACCO_USE: 1

## 2024-12-03 NOTE — ANESTHESIA PROCEDURE NOTES
Airway       Patient location during procedure: OR       Procedure Start/Stop Times: 12/3/2024 8:56 AM  Staff -        CRNA: Debbie Terry APRN CRNA       Performed By: CRNA  Consent for Airway        Urgency: elective  Indications and Patient Condition       Indications for airway management: stacy-procedural       Induction type:intravenous       Mask difficulty assessment: 2 - vent by mask + OA or adjuvant +/- NMBA    Final Airway Details       Final airway type: endotracheal airway       Successful airway: ETT - single  Endotracheal Airway Details        ETT size (mm): 8.0       Cuffed: yes       Successful intubation technique: video laryngoscopy       VL Blade Size: Glidescope 4       Grade View of Cords: 1       Adjucts: stylet       Position: Right       Measured from: gums/teeth       Secured at (cm): 24       Bite block used: None    Post intubation assessment        Placement verified by: capnometry, equal breath sounds and chest rise        Number of attempts at approach: 1       Number of other approaches attempted: 0       Secured with: tape       Ease of procedure: easy       Dentition: Intact and Unchanged    Medication(s) Administered   Medication Administration Time: 12/3/2024 8:56 AM

## 2024-12-03 NOTE — ANESTHESIA POSTPROCEDURE EVALUATION
Patient: Joey Dominguez    Procedure: Procedure(s):  VASECTOMY       Anesthesia Type:  General    Note:  Disposition: Outpatient   Postop Pain Control: Uneventful            Sign Out: Well controlled pain   PONV: No   Neuro/Psych: Uneventful            Sign Out: Acceptable/Baseline neuro status   Airway/Respiratory: Uneventful            Sign Out: Acceptable/Baseline resp. status   CV/Hemodynamics: Uneventful            Sign Out: Acceptable CV status; No obvious hypovolemia; No obvious fluid overload   Other NRE:    DID A NON-ROUTINE EVENT OCCUR?            Last vitals:  Vitals Value Taken Time   /96 12/03/24 1100   Temp 36.6  C (97.9  F) 12/03/24 1009   Pulse 82 12/03/24 1039   Resp 17 12/03/24 1039   SpO2 97 % 12/03/24 1100   Vitals shown include unfiled device data.    Electronically Signed By: Clarissa Rios MD  December 3, 2024  11:10 AM

## 2024-12-03 NOTE — DISCHARGE INSTRUCTIONS
To contact a doctor, call [phone] and ask for the Resident On Call for:          Urology (answered 24 hours a day)   Emergency Departments:  [facility] Emergency Department: [phone]

## 2024-12-03 NOTE — ANESTHESIA CARE TRANSFER NOTE
Patient: Joey Dominguez    Procedure: Procedure(s):  VASECTOMY       Diagnosis: Encounter for vasectomy [Z30.2]  Diagnosis Additional Information: No value filed.    Anesthesia Type:   General     Note:    Oropharynx: oropharynx clear of all foreign objects  Level of Consciousness: awake  Oxygen Supplementation: face mask  Level of Supplemental Oxygen (L/min / FiO2): 6  Independent Airway: airway patency satisfactory and stable  Dentition: dentition unchanged  Vital Signs Stable: post-procedure vital signs reviewed and stable  Report to RN Given: handoff report given  Patient transferred to: PACU    Handoff Report: Identifed the Patient, Identified the Reponsible Provider, Reviewed the pertinent medical history, Discussed the surgical course, Reviewed Intra-OP anesthesia mangement and issues during anesthesia, Set expectations for post-procedure period and Allowed opportunity for questions and acknowledgement of understanding  Vitals:  Vitals Value Taken Time   /79 12/03/24 1009   Temp 36.6  C (97.9  F) 12/03/24 1009   Pulse 99 12/03/24 1009   Resp 12 12/03/24 1009   SpO2 94 % 12/03/24 1009       Electronically Signed By: ANGELA Henderson CRNA  December 3, 2024  10:10 AM

## 2024-12-03 NOTE — OP NOTE
PREOPERATIVE DIAGNOSIS:   Desires sterility  POSTOPERATIVE DIAGNOSIS: Desires sterility    PROCEDURE PERFORMED:   1. Bilateral segmental vasectomy     STAFF SURGEON: Lucio Padilla MD. Please note that Dr. Padilla was present and scrubbed for the entire procedure.     RESIDENT SURGEON:  Hayden Kline MD    ANESTHESIA: General, local    ESTIMATED BLOOD LOSS: 1 mL.    SPECIMENS: none     COMPLICATIONS: None.     SIGNIFICANT FINDINGS: standard bilateral vasectomy with fascial interposition.    INDICATIONS FOR PROCEDURE: Joey Dominguez is a 39 year old man who presented to clinic desiring sterility.    The risks, benefits, alternatives were discussed with the patient and he was consented on an elective basis to have this done.     DESCRIPTION OF PROCEDURE: After obtaining informed consent, properly marking and identifying the patient in the preoperative area, he was brought to the operating room, placed on the operating room table in the supine position. Anesthesia was induced. The patient was subsequently shaved, prepped and draped in the standard sterile fashion. A timeout was performed verifying the patient and procedure prior to beginning.   The right vas was ligated first.  This was done using the standard technique by grasping it with a three-finger  and lifting it up to the skin.  A small amount of lidocaine was infiltrated into the skin and vasal sheath.  The skin was punctured and dilated bluntly using the scalpel-less dissector.  The sheath was identified and a rigid clamp was passed around the vas which was then lifted out of the incision.  The vas was cleaned off from the deferential vessels and the sheath, and cautery was used to divide the vas between mosquitos.  A small segment was removed and discarded.  The lumen of the vas was cannulated to confirm its identity, and the lumens of both ends were cauterized.  Pen cautery was used sparingly/as needed for minor bleeders.  A facial interposition was  then performed with a single suture of 4-0 chromic. The skin defect was closed with a 4-0 chromic interrupted suture after confirming adequate hemostasis.       We then turned our attention to the left side and a similar technique was performed. This involved division, excision of a segment, cautery of the lumens, and fascial interposition. On the patient's left, we had to use an additional skin opening to find and isolate the vas.  All skin incisions were closed with a single suture of 4-0 chromic.     There were no hematomas noted at the end of the procedure.  The patient tolerated the procedure well with no apparent complications and left for the PACU in stable condition.    Hayden Kline MD  PGY3 Urology Resident       I was present and scrubbed for the entire procedure.  Lucio Padilla MD  Urology Staff

## 2024-12-03 NOTE — CONSULTS
"Consult received for Vascular access care.  See LDA for details. For additional needs place \"Nursing to Consult for Vascular Access\" [id number] order in EPIC.  "

## 2024-12-04 DIAGNOSIS — Z30.2 ENCOUNTER FOR VASECTOMY: Primary | ICD-10-CM

## 2025-03-08 ENCOUNTER — HEALTH MAINTENANCE LETTER (OUTPATIENT)
Age: 40
End: 2025-03-08

## (undated) DEVICE — GLOVE BIOGEL PI MICRO SZ 7.5 48575

## (undated) DEVICE — Device

## (undated) DEVICE — DRAPE LAP W/ARMBOARD 29410

## (undated) DEVICE — DRSG KERLIX FLUFFS X5

## (undated) DEVICE — SU CHROMIC 4-0 SH 27" G121H

## (undated) DEVICE — LINEN GOWN X4 5410

## (undated) DEVICE — ESU ELEC NDL 1" COATED/INSULATED E1465

## (undated) DEVICE — SUPPORTER ATHLETIC LG LATEX 202636

## (undated) DEVICE — ESU GROUND PAD UNIVERSAL W/O CORD

## (undated) DEVICE — SOL WATER IRRIG 1000ML BOTTLE 2F7114

## (undated) DEVICE — LINEN ORTHO PACK 5446

## (undated) DEVICE — NDL 30GA 0.5" 305106

## (undated) DEVICE — COVER CAMERA IN-LIGHT DISP LT-C02

## (undated) DEVICE — SOL NACL 0.9% IRRIG 1000ML BOTTLE 2F7124

## (undated) DEVICE — OINTMENT ANTIBIOTIC BACITRACIN ZINC .9 G 1171

## (undated) DEVICE — SU CHROMIC 4-0 RB-1 27" U203H

## (undated) DEVICE — PREP CHLORAPREP 26ML TINTED HI-LITE ORANGE 930815

## (undated) DEVICE — BLADE CLIPPER DISP 4406

## (undated) RX ORDER — FENTANYL CITRATE-0.9 % NACL/PF 10 MCG/ML
PLASTIC BAG, INJECTION (ML) INTRAVENOUS
Status: DISPENSED
Start: 2024-12-03

## (undated) RX ORDER — FENTANYL CITRATE 50 UG/ML
INJECTION, SOLUTION INTRAMUSCULAR; INTRAVENOUS
Status: DISPENSED
Start: 2024-12-03

## (undated) RX ORDER — DEXAMETHASONE SODIUM PHOSPHATE 4 MG/ML
INJECTION, SOLUTION INTRA-ARTICULAR; INTRALESIONAL; INTRAMUSCULAR; INTRAVENOUS; SOFT TISSUE
Status: DISPENSED
Start: 2024-12-03

## (undated) RX ORDER — CEFAZOLIN SODIUM/WATER 3 G/30 ML
SYRINGE (ML) INTRAVENOUS
Status: DISPENSED
Start: 2024-12-03

## (undated) RX ORDER — BUPIVACAINE HYDROCHLORIDE 5 MG/ML
INJECTION, SOLUTION EPIDURAL; INTRACAUDAL
Status: DISPENSED
Start: 2024-12-03

## (undated) RX ORDER — ONDANSETRON 2 MG/ML
INJECTION INTRAMUSCULAR; INTRAVENOUS
Status: DISPENSED
Start: 2024-12-03

## (undated) RX ORDER — PROPOFOL 10 MG/ML
INJECTION, EMULSION INTRAVENOUS
Status: DISPENSED
Start: 2024-12-03

## (undated) RX ORDER — ACETAMINOPHEN 325 MG/1
TABLET ORAL
Status: DISPENSED
Start: 2024-12-03